# Patient Record
Sex: FEMALE | Race: WHITE | Employment: OTHER | URBAN - METROPOLITAN AREA
[De-identification: names, ages, dates, MRNs, and addresses within clinical notes are randomized per-mention and may not be internally consistent; named-entity substitution may affect disease eponyms.]

---

## 2021-01-09 ENCOUNTER — HOSPITAL ENCOUNTER (INPATIENT)
Age: 71
LOS: 3 days | Discharge: HOME | DRG: 177 | End: 2021-01-12
Attending: EMERGENCY MEDICINE | Admitting: INTERNAL MEDICINE
Payer: MEDICARE

## 2021-01-09 ENCOUNTER — APPOINTMENT (OUTPATIENT)
Dept: GENERAL RADIOLOGY | Age: 71
DRG: 177 | End: 2021-01-09
Attending: EMERGENCY MEDICINE
Payer: MEDICARE

## 2021-01-09 DIAGNOSIS — R09.02 HYPOXIA: ICD-10-CM

## 2021-01-09 DIAGNOSIS — Z20.822 SUSPECTED COVID-19 VIRUS INFECTION: Primary | ICD-10-CM

## 2021-01-09 DIAGNOSIS — R06.02 SOB (SHORTNESS OF BREATH): ICD-10-CM

## 2021-01-09 PROBLEM — U07.1 COVID-19 VIRUS INFECTION: Status: ACTIVE | Noted: 2021-01-09

## 2021-01-09 LAB
ABO + RH BLD: NORMAL
ALBUMIN SERPL-MCNC: 3.8 G/DL (ref 3.5–5)
ALBUMIN/GLOB SERPL: 1.1 {RATIO} (ref 1.1–2.2)
ALP SERPL-CCNC: 50 U/L (ref 45–117)
ALT SERPL-CCNC: 92 U/L (ref 12–78)
ANION GAP SERPL CALC-SCNC: 7 MMOL/L (ref 5–15)
APTT PPP: 26.4 SEC (ref 22.1–31)
AST SERPL-CCNC: 144 U/L (ref 15–37)
ATRIAL RATE: 102 BPM
BASOPHILS # BLD: 0 K/UL (ref 0–0.1)
BASOPHILS NFR BLD: 1 % (ref 0–1)
BILIRUB SERPL-MCNC: 0.7 MG/DL (ref 0.2–1)
BLOOD GROUP ANTIBODIES SERPL: NORMAL
BNP SERPL-MCNC: 38 PG/ML
BUN SERPL-MCNC: 13 MG/DL (ref 6–20)
BUN/CREAT SERPL: 14 (ref 12–20)
CALCIUM SERPL-MCNC: 9 MG/DL (ref 8.5–10.1)
CALCULATED P AXIS, ECG09: 34 DEGREES
CALCULATED R AXIS, ECG10: 7 DEGREES
CALCULATED T AXIS, ECG11: 4 DEGREES
CHLORIDE SERPL-SCNC: 103 MMOL/L (ref 97–108)
CO2 SERPL-SCNC: 25 MMOL/L (ref 21–32)
COMMENT, HOLDF: NORMAL
COVID-19 RAPID TEST, COVR: DETECTED
CREAT SERPL-MCNC: 0.96 MG/DL (ref 0.55–1.02)
CRP SERPL-MCNC: 0.49 MG/DL (ref 0–0.6)
D DIMER PPP FEU-MCNC: 0.7 MG/L FEU (ref 0–0.65)
DIAGNOSIS, 93000: NORMAL
DIFFERENTIAL METHOD BLD: NORMAL
EOSINOPHIL # BLD: 0 K/UL (ref 0–0.4)
EOSINOPHIL NFR BLD: 1 % (ref 0–7)
ERYTHROCYTE [DISTWIDTH] IN BLOOD BY AUTOMATED COUNT: 12.7 % (ref 11.5–14.5)
FERRITIN SERPL-MCNC: 405 NG/ML (ref 8–252)
FIBRINOGEN PPP-MCNC: 376 MG/DL (ref 200–475)
GLOBULIN SER CALC-MCNC: 3.4 G/DL (ref 2–4)
GLUCOSE BLD STRIP.AUTO-MCNC: 179 MG/DL (ref 65–100)
GLUCOSE BLD STRIP.AUTO-MCNC: 301 MG/DL (ref 65–100)
GLUCOSE BLD STRIP.AUTO-MCNC: 311 MG/DL (ref 65–100)
GLUCOSE SERPL-MCNC: 231 MG/DL (ref 65–100)
HCT VFR BLD AUTO: 43 % (ref 35–47)
HGB BLD-MCNC: 14.1 G/DL (ref 11.5–16)
IMM GRANULOCYTES # BLD AUTO: 0 K/UL (ref 0–0.04)
IMM GRANULOCYTES NFR BLD AUTO: 0 % (ref 0–0.5)
INR PPP: 1 (ref 0.9–1.1)
LDH SERPL L TO P-CCNC: 214 U/L (ref 81–246)
LYMPHOCYTES # BLD: 1.3 K/UL (ref 0.8–3.5)
LYMPHOCYTES NFR BLD: 30 % (ref 12–49)
MAGNESIUM SERPL-MCNC: 1.7 MG/DL (ref 1.6–2.4)
MCH RBC QN AUTO: 28.5 PG (ref 26–34)
MCHC RBC AUTO-ENTMCNC: 32.8 G/DL (ref 30–36.5)
MCV RBC AUTO: 86.9 FL (ref 80–99)
MONOCYTES # BLD: 0.5 K/UL (ref 0–1)
MONOCYTES NFR BLD: 13 % (ref 5–13)
NEUTS SEG # BLD: 2.4 K/UL (ref 1.8–8)
NEUTS SEG NFR BLD: 55 % (ref 32–75)
NRBC # BLD: 0 K/UL (ref 0–0.01)
NRBC BLD-RTO: 0 PER 100 WBC
P-R INTERVAL, ECG05: 134 MS
PLATELET # BLD AUTO: 152 K/UL (ref 150–400)
PMV BLD AUTO: 11.4 FL (ref 8.9–12.9)
POTASSIUM SERPL-SCNC: 3.5 MMOL/L (ref 3.5–5.1)
PROT SERPL-MCNC: 7.2 G/DL (ref 6.4–8.2)
PROTHROMBIN TIME: 10.9 SEC (ref 9–11.1)
Q-T INTERVAL, ECG07: 334 MS
QRS DURATION, ECG06: 82 MS
QTC CALCULATION (BEZET), ECG08: 435 MS
RBC # BLD AUTO: 4.95 M/UL (ref 3.8–5.2)
SAMPLES BEING HELD,HOLD: NORMAL
SERVICE CMNT-IMP: ABNORMAL
SODIUM SERPL-SCNC: 135 MMOL/L (ref 136–145)
SOURCE, COVRS: ABNORMAL
SPECIMEN EXP DATE BLD: NORMAL
SPECIMEN SOURCE, FCOV2M: ABNORMAL
SPECIMEN TYPE, XMCV1T: ABNORMAL
THERAPEUTIC RANGE,PTTT: NORMAL SECS (ref 58–77)
TROPONIN I SERPL-MCNC: <0.05 NG/ML
VENTRICULAR RATE, ECG03: 102 BPM
WBC # BLD AUTO: 4.2 K/UL (ref 3.6–11)

## 2021-01-09 PROCEDURE — 86900 BLOOD TYPING SEROLOGIC ABO: CPT

## 2021-01-09 PROCEDURE — 86140 C-REACTIVE PROTEIN: CPT

## 2021-01-09 PROCEDURE — 65660000000 HC RM CCU STEPDOWN

## 2021-01-09 PROCEDURE — 71045 X-RAY EXAM CHEST 1 VIEW: CPT

## 2021-01-09 PROCEDURE — 36415 COLL VENOUS BLD VENIPUNCTURE: CPT

## 2021-01-09 PROCEDURE — 93005 ELECTROCARDIOGRAM TRACING: CPT

## 2021-01-09 PROCEDURE — XW033E5 INTRODUCTION OF REMDESIVIR ANTI-INFECTIVE INTO PERIPHERAL VEIN, PERCUTANEOUS APPROACH, NEW TECHNOLOGY GROUP 5: ICD-10-PCS | Performed by: HOSPITALIST

## 2021-01-09 PROCEDURE — 74011250637 HC RX REV CODE- 250/637: Performed by: HOSPITALIST

## 2021-01-09 PROCEDURE — 74011000250 HC RX REV CODE- 250: Performed by: HOSPITALIST

## 2021-01-09 PROCEDURE — 74011000258 HC RX REV CODE- 258: Performed by: HOSPITALIST

## 2021-01-09 PROCEDURE — 83615 LACTATE (LD) (LDH) ENZYME: CPT

## 2021-01-09 PROCEDURE — 99285 EMERGENCY DEPT VISIT HI MDM: CPT

## 2021-01-09 PROCEDURE — 87635 SARS-COV-2 COVID-19 AMP PRB: CPT

## 2021-01-09 PROCEDURE — 74011250636 HC RX REV CODE- 250/636: Performed by: INTERNAL MEDICINE

## 2021-01-09 PROCEDURE — 85730 THROMBOPLASTIN TIME PARTIAL: CPT

## 2021-01-09 PROCEDURE — 83880 ASSAY OF NATRIURETIC PEPTIDE: CPT

## 2021-01-09 PROCEDURE — 82962 GLUCOSE BLOOD TEST: CPT

## 2021-01-09 PROCEDURE — 82728 ASSAY OF FERRITIN: CPT

## 2021-01-09 PROCEDURE — 84484 ASSAY OF TROPONIN QUANT: CPT

## 2021-01-09 PROCEDURE — 85384 FIBRINOGEN ACTIVITY: CPT

## 2021-01-09 PROCEDURE — 85025 COMPLETE CBC W/AUTO DIFF WBC: CPT

## 2021-01-09 PROCEDURE — 74011250636 HC RX REV CODE- 250/636: Performed by: HOSPITALIST

## 2021-01-09 PROCEDURE — 83735 ASSAY OF MAGNESIUM: CPT

## 2021-01-09 PROCEDURE — 74011636637 HC RX REV CODE- 636/637: Performed by: INTERNAL MEDICINE

## 2021-01-09 PROCEDURE — 80053 COMPREHEN METABOLIC PANEL: CPT

## 2021-01-09 PROCEDURE — 85610 PROTHROMBIN TIME: CPT

## 2021-01-09 PROCEDURE — 74011250637 HC RX REV CODE- 250/637: Performed by: INTERNAL MEDICINE

## 2021-01-09 PROCEDURE — 85379 FIBRIN DEGRADATION QUANT: CPT

## 2021-01-09 RX ORDER — ACETAMINOPHEN 325 MG/1
650 TABLET ORAL
Status: DISCONTINUED | OUTPATIENT
Start: 2021-01-09 | End: 2021-01-12 | Stop reason: HOSPADM

## 2021-01-09 RX ORDER — ACETAMINOPHEN 650 MG/1
650 SUPPOSITORY RECTAL
Status: DISCONTINUED | OUTPATIENT
Start: 2021-01-09 | End: 2021-01-12 | Stop reason: HOSPADM

## 2021-01-09 RX ORDER — GUAIFENESIN 100 MG/5ML
81 LIQUID (ML) ORAL DAILY
Status: DISCONTINUED | OUTPATIENT
Start: 2021-01-10 | End: 2021-01-12 | Stop reason: HOSPADM

## 2021-01-09 RX ORDER — ENOXAPARIN SODIUM 100 MG/ML
40 INJECTION SUBCUTANEOUS EVERY 12 HOURS
Status: DISCONTINUED | OUTPATIENT
Start: 2021-01-10 | End: 2021-01-12 | Stop reason: HOSPADM

## 2021-01-09 RX ORDER — OMEGA-3 FATTY ACIDS/FISH OIL 300-1000MG
1 CAPSULE ORAL 2 TIMES DAILY
Status: DISCONTINUED | OUTPATIENT
Start: 2021-01-09 | End: 2021-01-09 | Stop reason: CLARIF

## 2021-01-09 RX ORDER — FERROUS SULFATE, DRIED 160(50) MG
1 TABLET, EXTENDED RELEASE ORAL 2 TIMES DAILY WITH MEALS
Status: DISCONTINUED | OUTPATIENT
Start: 2021-01-09 | End: 2021-01-12 | Stop reason: HOSPADM

## 2021-01-09 RX ORDER — ENOXAPARIN SODIUM 100 MG/ML
30 INJECTION SUBCUTANEOUS EVERY 12 HOURS
Status: DISCONTINUED | OUTPATIENT
Start: 2021-01-09 | End: 2021-01-09

## 2021-01-09 RX ORDER — MAGNESIUM SULFATE 100 %
4 CRYSTALS MISCELLANEOUS AS NEEDED
Status: DISCONTINUED | OUTPATIENT
Start: 2021-01-09 | End: 2021-01-12 | Stop reason: HOSPADM

## 2021-01-09 RX ORDER — LISINOPRIL 20 MG/1
20 TABLET ORAL DAILY
Status: DISCONTINUED | OUTPATIENT
Start: 2021-01-10 | End: 2021-01-12 | Stop reason: HOSPADM

## 2021-01-09 RX ORDER — FAMOTIDINE 20 MG/1
20 TABLET, FILM COATED ORAL 2 TIMES DAILY
Status: DISCONTINUED | OUTPATIENT
Start: 2021-01-09 | End: 2021-01-12 | Stop reason: HOSPADM

## 2021-01-09 RX ORDER — DEXTROSE 50 % IN WATER (D50W) INTRAVENOUS SYRINGE
12.5-25 AS NEEDED
Status: DISCONTINUED | OUTPATIENT
Start: 2021-01-09 | End: 2021-01-12 | Stop reason: HOSPADM

## 2021-01-09 RX ORDER — INSULIN LISPRO 100 [IU]/ML
INJECTION, SOLUTION INTRAVENOUS; SUBCUTANEOUS
Status: DISCONTINUED | OUTPATIENT
Start: 2021-01-09 | End: 2021-01-12 | Stop reason: HOSPADM

## 2021-01-09 RX ORDER — HYDROCHLOROTHIAZIDE 25 MG/1
25 TABLET ORAL DAILY
Status: DISCONTINUED | OUTPATIENT
Start: 2021-01-10 | End: 2021-01-12 | Stop reason: HOSPADM

## 2021-01-09 RX ORDER — ONDANSETRON 4 MG/1
4 TABLET, ORALLY DISINTEGRATING ORAL
Status: DISCONTINUED | OUTPATIENT
Start: 2021-01-09 | End: 2021-01-12 | Stop reason: HOSPADM

## 2021-01-09 RX ORDER — GUAIFENESIN/DEXTROMETHORPHAN 100-10MG/5
5 SYRUP ORAL
Status: DISCONTINUED | OUTPATIENT
Start: 2021-01-09 | End: 2021-01-12 | Stop reason: HOSPADM

## 2021-01-09 RX ORDER — DEXAMETHASONE 4 MG/1
6 TABLET ORAL DAILY
Status: DISCONTINUED | OUTPATIENT
Start: 2021-01-09 | End: 2021-01-12 | Stop reason: HOSPADM

## 2021-01-09 RX ADMIN — DEXAMETHASONE 6 MG: 4 TABLET ORAL at 13:35

## 2021-01-09 RX ADMIN — Medication 1 TABLET: at 18:22

## 2021-01-09 RX ADMIN — REMDESIVIR 200 MG: 100 INJECTION, POWDER, LYOPHILIZED, FOR SOLUTION INTRAVENOUS at 13:36

## 2021-01-09 RX ADMIN — ENOXAPARIN SODIUM 30 MG: 30 INJECTION SUBCUTANEOUS at 12:59

## 2021-01-09 RX ADMIN — ENOXAPARIN SODIUM 40 MG: 40 INJECTION SUBCUTANEOUS at 23:30

## 2021-01-09 RX ADMIN — INSULIN LISPRO 4 UNITS: 100 INJECTION, SOLUTION INTRAVENOUS; SUBCUTANEOUS at 22:53

## 2021-01-09 RX ADMIN — INSULIN LISPRO 7 UNITS: 100 INJECTION, SOLUTION INTRAVENOUS; SUBCUTANEOUS at 18:24

## 2021-01-09 RX ADMIN — FAMOTIDINE 20 MG: 20 TABLET, FILM COATED ORAL at 13:35

## 2021-01-09 RX ADMIN — Medication 1 CAPSULE: at 18:22

## 2021-01-09 RX ADMIN — FAMOTIDINE 20 MG: 20 TABLET, FILM COATED ORAL at 22:01

## 2021-01-09 NOTE — H&P
Hospitalist Admission Note    NAME: Howard Ngo   :     MRN:  [id number]     Date/Time:  2021 11:36 AM    Patient PCP: Madelyn Gordon MD  ________________________________________________________________________    My assessment of this patient's clinical condition and my plan of care is as follows. Assessment / Plan:  Update  Sarscov2 positive so will start Decadron and remdesivir. D/w pt about risks and benefits of remdesivir and she agreed. Suspected COVID-19 pneumonia  Mild hypoxia  -Patient desaturated to about 89% and had to be started on 2 L nasal cannula but is comfortable and not using accessory muscles  -Had exposure to grandson about 5 days ago  -ER has ordered SARS-CoV-2 but is still pending  -Consider starting on Decadron and remdesivir if positive. We will also start vitamin C and zinc as well if positive. -D-dimer is slightly elevated at 0.7. Check D-dimer daily  -Check procalcitonin level and if elevated will start antibiotics  -Continue oxygen by nasal cannula currently she is on 2 L  -Continue Lovenox for DVT prophylaxis    Mild transaminitis likely due to COVID-19  -Check CMP in a.m. CMP will need to be monitored daily if started on remdesivir. Diabetes mellitus type 2  -On Metformin and Trulicity at home  -Start insulin sliding scale with blood sugar checks. Hypertension  Dyslipidemia  Obesity  -Resume home lisinopril and HCTZ  -Resume home blood was on          Code Status: Full code  Surrogate Decision Maker:     DVT Prophylaxis: Lovenox  GI Prophylaxis: not indicated    Baseline: From home independent        Subjective:   CHIEF COMPLAINT: Shortness of breath    HISTORY OF PRESENT ILLNESS:     Anita Weeks is a 72 y.o. female who presents with past medical history of diabetes mellitus, hypertension, dyslipidemia is coming the hospital chief complaint of shortness of breath and also generalized weakness.   Patient reports she had exposure to her grandson who tested positive for COVID-19. She reports shortness of breath is worse with minimal exertion and also has cough without any phlegm. She also reports feeling weak in general involving all 4 extremities. She also reports fatigue. She reports myalgias along with chills. Does not report any fever at home. Does not report any abdominal pain, nausea or vomiting. Denies focal weakness, tingling or numbness. Denies long distance travel, trauma rash over the chest.    On arrival to ED, she was noted to have hypoxia and required 2 L oxygen by nasal cannula. On labs CBC was within normal limits. CMP showed elevated ALT of 92 and AST of 144. Troponin was 0.05. We were asked to admit for work up and evaluation of the above problems. Past Medical History:   Diagnosis Date    HTN 5/19/2009    Hyperlipidemia 5/19/2009    Menopause     Type II or unspecified type diabetes mellitus without mention of complication, not stated as uncontrolled 5/19/2009        Past Surgical History:   Procedure Laterality Date    HX TONSILLECTOMY         Social History     Tobacco Use    Smoking status: Never Smoker    Smokeless tobacco: Never Used   Substance Use Topics    Alcohol use: No        Family History   Problem Relation Age of Onset    Hypertension Mother     Cancer Mother         carcinoid on small intestine    Cancer Father         stomach    Allergy-severe Neg Hx      Allergies   Allergen Reactions    Codeine Itching        Prior to Admission medications    Medication Sig Start Date End Date Taking? Authorizing Provider   benazepril-hydrochlorothiazide (LOTENSIN HCT) 20-25 mg per tablet TAKE 1 TABLET BY MOUTH DAILY 5/14/13   Freda Gordillo MD   LOVAZA 1 gram capsule TAKE ONE CAPSULE BY MOUTH TWICE A DAY 3/16/13   Freda Gordillo MD   aspirin 81 mg chewable tablet Take 81 mg by mouth daily.     Provider, Historical   calcium-vitamin D (CALCIUM 500+D) 500 mg(1,250mg) -200 unit per tablet Take 1 Tab by mouth two (2) times daily (with meals). Provider, Historical       REVIEW OF SYSTEMS:     I am not able to complete the review of systems because: The patient is intubated and sedated    The patient has altered mental status due to his acute medical problems    The patient has baseline aphasia from prior stroke(s)    The patient has baseline dementia and is not reliable historian    The patient is in acute medical distress and unable to provide information           Total of 12 systems reviewed as follows:       POSITIVE= underlined text  Negative = text not underlined  General:  fever, chills, sweats, generalized weakness, weight loss/gain,      loss of appetite   Eyes:    blurred vision, eye pain, loss of vision, double vision  ENT:    rhinorrhea, pharyngitis   Respiratory:   cough, sputum production, SOB, HUMPHREYS, wheezing, pleuritic pain   Cardiology:   chest pain, palpitations, orthopnea, PND, edema, syncope   Gastrointestinal:  abdominal pain , N/V, diarrhea, dysphagia, constipation, bleeding   Genitourinary:  frequency, urgency, dysuria, hematuria, incontinence   Muskuloskeletal :  arthralgia, myalgia, back pain  Hematology:  easy bruising, nose or gum bleeding, lymphadenopathy   Dermatological: rash, ulceration, pruritis, color change / jaundice  Endocrine:   hot flashes or polydipsia   Neurological:  headache, dizziness, confusion, focal weakness, paresthesia,     Speech difficulties, memory loss, gait difficulty  Psychological: Feelings of anxiety, depression, agitation    Objective:   VITALS:    Visit Vitals  BP (!) 141/75   Pulse 95   Temp 99.9 °F (37.7 °C)   Resp 25   Ht 5' 6\" (1.676 m)   Wt 95.3 kg (210 lb)   SpO2 90%   BMI 33.89 kg/m²       PHYSICAL EXAM:    General:    Alert, cooperative, no distress, appears stated age.      HEENT: Atraumatic, anicteric sclerae, pink conjunctivae     No oral ulcers, mucosa moist, throat clear, dentition fair  Neck:  Supple, symmetrical, thyroid: non tender  Lungs:   Clear to auscultation bilaterally. No Wheezing or Rhonchi. No rales. Chest wall:  No tenderness  No Accessory muscle use. Heart:   Regular  rhythm,  No  murmur   No edema  Abdomen:   Soft, non-tender. Not distended. Bowel sounds normal  Extremities: No cyanosis. No clubbing,      Skin turgor normal, Capillary refill normal, Radial dial pulse 2+  Skin:     Not pale. Not Jaundiced  No rashes   Psych:  Not anxious or agitated. Neurologic: EOMs intact. No facial asymmetry. No aphasia or slurred speech. Symmetrical strength, Sensation grossly intact. Alert and oriented X 4.     _______________________________________________________________________  Care Plan discussed with:    Comments   Patient y    Family  y    RN y    Care Manager                    Consultant:      _______________________________________________________________________  Expected  Disposition:   Home with Family y   HH/PT/OT/RN    SNF/LTC    [facility]    ________________________________________________________________________  TOTAL TIME: 61  Minutes    Critical Care Provided     Minutes non procedure based      Comments    y Reviewed previous records   >50% of visit spent in counseling and coordination of care y Discussion with patient and/or family and questions answered       ________________________________________________________________________  Signed: Lopez Jordan MD    Procedures: see electronic medical records for all procedures/Xrays and details which were not copied into this note but were reviewed prior to creation of Plan.     LAB DATA REVIEWED:    Recent Results (from the past 24 hour(s))   CBC WITH AUTOMATED DIFF    Collection Time: 01/09/21  9:30 AM   Result Value Ref Range    WBC 4.2 3.6 - 11.0 K/uL    RBC 4.95 3.80 - 5.20 M/uL    HGB 14.1 11.5 - 16.0 g/dL    HCT 43.0 35.0 - 47.0 %    MCV 86.9 80.0 - 99.0 FL    MCH 28.5 26.0 - 34.0 PG    MCHC 32.8 30.0 - 36.5 g/dL    RDW 12.7 11.5 - 14.5 % PLATELET 667 816 - 863 K/uL    MPV 11.4 8.9 - 12.9 FL    NRBC 0.0 0  WBC    ABSOLUTE NRBC 0.00 0.00 - 0.01 K/uL    NEUTROPHILS 55 32 - 75 %    LYMPHOCYTES 30 12 - 49 %    MONOCYTES 13 5 - 13 %    EOSINOPHILS 1 0 - 7 %    BASOPHILS 1 0 - 1 %    IMMATURE GRANULOCYTES 0 0.0 - 0.5 %    ABS. NEUTROPHILS 2.4 1.8 - 8.0 K/UL    ABS. LYMPHOCYTES 1.3 0.8 - 3.5 K/UL    ABS. MONOCYTES 0.5 0.0 - 1.0 K/UL    ABS. EOSINOPHILS 0.0 0.0 - 0.4 K/UL    ABS. BASOPHILS 0.0 0.0 - 0.1 K/UL    ABS. IMM. GRANS. 0.0 0.00 - 0.04 K/UL    DF AUTOMATED     METABOLIC PANEL, COMPREHENSIVE    Collection Time: 01/09/21  9:30 AM   Result Value Ref Range    Sodium 135 (L) 136 - 145 mmol/L    Potassium 3.5 3.5 - 5.1 mmol/L    Chloride 103 97 - 108 mmol/L    CO2 25 21 - 32 mmol/L    Anion gap 7 5 - 15 mmol/L    Glucose 231 (H) 65 - 100 mg/dL    BUN 13 6 - 20 MG/DL    Creatinine 0.96 0.55 - 1.02 MG/DL    BUN/Creatinine ratio 14 12 - 20      GFR est AA >60 >60 ml/min/1.73m2    GFR est non-AA 58 (L) >60 ml/min/1.73m2    Calcium 9.0 8.5 - 10.1 MG/DL    Bilirubin, total 0.7 0.2 - 1.0 MG/DL    ALT (SGPT) 92 (H) 12 - 78 U/L    AST (SGOT) 144 (H) 15 - 37 U/L    Alk. phosphatase 50 45 - 117 U/L    Protein, total 7.2 6.4 - 8.2 g/dL    Albumin 3.8 3.5 - 5.0 g/dL    Globulin 3.4 2.0 - 4.0 g/dL    A-G Ratio 1.1 1.1 - 2.2     NT-PRO BNP    Collection Time: 01/09/21  9:30 AM   Result Value Ref Range    NT pro-BNP 38 <125 PG/ML   TROPONIN I    Collection Time: 01/09/21  9:30 AM   Result Value Ref Range    Troponin-I, Qt. <0.05 <0.05 ng/mL   D DIMER    Collection Time: 01/09/21  9:30 AM   Result Value Ref Range    D-dimer 0.70 (H) 0.00 - 0.65 mg/L FEU   SAMPLES BEING HELD    Collection Time: 01/09/21  9:30 AM   Result Value Ref Range    SAMPLES BEING HELD 1RED     COMMENT        Add-on orders for these samples will be processed based on acceptable specimen integrity and analyte stability, which may vary by analyte.    EKG, 12 LEAD, INITIAL    Collection Time: 01/09/21  9:42 AM   Result Value Ref Range    Ventricular Rate 102 BPM    Atrial Rate 102 BPM    P-R Interval 134 ms    QRS Duration 82 ms    Q-T Interval 334 ms    QTC Calculation (Bezet) 435 ms    Calculated P Axis 34 degrees    Calculated R Axis 7 degrees    Calculated T Axis 4 degrees    Diagnosis Sinus tachycardia  No previous ECGs available

## 2021-01-09 NOTE — PROGRESS NOTES
Lovenox Monitoring  Indication: low intensity anticoagulation for COVID-19  Recent Labs     01/09/21  0930   HGB 14.1      INR 1.0   CREA 0.96     Current Weight: 95.3 kg (BMI 33.9)  Est. CrCl = 64 ml/min  Current Dose: 30 mg subcutaneously every 12 hours.   Plan: Change to 40 mg Q12H for BMI > 30.    (CLOSE I-vent after review and verification)

## 2021-01-09 NOTE — ROUTINE PROCESS
TRANSFER - OUT REPORT: 
 
Verbal report given to [facility]) on Sergio Gadlamez  being transferred to (unit) for routine progression of care Report consisted of patients Situation, Background, Assessment and  
Recommendations(SBAR). Information from the following report(s) SBAR, ED Summary, [facility] and Recent Results was reviewed with the receiving nurse. Lines:  
Peripheral IV 01/09/21 Left Antecubital (Active) Site Assessment Clean, dry, & intact 01/09/21 0944 Phlebitis Assessment 0 01/09/21 0944 Infiltration Assessment 0 01/09/21 0944 Dressing Status Clean, dry, & intact 01/09/21 0944 Opportunity for questions and clarification was provided. Patient transported with: 
 [employer]

## 2021-01-09 NOTE — ED NOTES
Pt reports SOB x3 days. Pt was exposed to her grandson who tested positive for COVID. Pt has hx of DM and HTN.

## 2021-01-09 NOTE — ED PROVIDER NOTES
HPI     Please note that this dictation was completed with [employer], the computer voice recognition software. Quite often unanticipated grammatical, syntax, homophones, and other interpretive errors are inadvertently transcribed by the computer software. Please disregard these errors. Please excuse any errors that have escaped final proofreading. 73 yo female wit h/o HTN, Hyperlipidemia, DM here with cough and sob. Pt exposed to grandson with covid 5 days ago. Pt developed cough and some sob 2 days ago on Thursday. The sob seems to be better when walking. She feels like she can't get enough air in. Pulse ox at home was 86-88% RA and same pulse ox on  was upper 90's. Denies congestion or anosmia.  + fatigue. Denies leg pain, swelling or h/o blood clots. Denies cp, fever, n/v/d, or other complaints. SHX:  . Nonsmoker.       Past Medical History:   Diagnosis Date    HTN 5/19/2009    Hyperlipidemia 5/19/2009    Menopause     Type II or unspecified type diabetes mellitus without mention of complication, not stated as uncontrolled 5/19/2009       Past Surgical History:   Procedure Laterality Date    HX TONSILLECTOMY           Family History:   Problem Relation Age of Onset    Hypertension Mother     Cancer Mother         carcinoid on small intestine    Cancer Father         stomach    Allergy-severe Neg Hx        Social History     Socioeconomic History    Marital status:      Spouse name: Not on file    Number of children: Not on file    Years of education: Not on file    Highest education level: Not on file   Occupational History    Not on file   Social Needs    Financial resource strain: Not on file    Food insecurity     Worry: Not on file     Inability: Not on file    Transportation needs     Medical: Not on file     Non-medical: Not on file   Tobacco Use    Smoking status: Never Smoker    Smokeless tobacco: Never Used   Substance and Sexual Activity    Alcohol use: No    Drug use: No    Sexual activity: Yes   Lifestyle    Physical activity     Days per week: Not on file     Minutes per session: Not on file    Stress: Not on file   Relationships    Social connections     Talks on phone: Not on file     Gets together: Not on file     Attends [employer] service: Not on file     Active member of club or organization: Not on file     Attends meetings of clubs or organizations: Not on file     Relationship status: Not on file    Intimate partner violence     Fear of current or ex partner: Not on file     Emotionally abused: Not on file     Physically abused: Not on file     Forced sexual activity: Not on file   Other Topics Concern    Not on file   Social History Narrative    Not on file         ALLERGIES: Codeine    Review of Systems   Constitutional: Positive for fatigue. Negative for chills and fever. Respiratory: Positive for shortness of breath. Cardiovascular: Negative for chest pain. All other systems reviewed and are negative. Vitals:    01/09/21 0918   BP: (!) 142/112   Pulse: (!) 111   Resp: 18   Temp: 99.9 °F (37.7 °C)   SpO2: 95%   Weight: 95.3 kg (210 lb)   Height: 5' 6\" (1.676 m)            Physical Exam  Vitals signs and nursing note reviewed. Constitutional:       Appearance: She is well-developed. HENT:      Head: Normocephalic and atraumatic. Nose: No congestion or rhinorrhea. Mouth/Throat:      Mouth: Mucous membranes are moist.      Pharynx: No oropharyngeal exudate. Eyes:      General: No scleral icterus. Right eye: No discharge. Left eye: No discharge. Conjunctiva/sclera: Conjunctivae normal.   Neck:      Musculoskeletal: Normal range of motion and neck supple. Cardiovascular:      Rate and Rhythm: Regular rhythm. Tachycardia present. Heart sounds: Normal heart sounds. No murmur. No friction rub. No gallop. Pulmonary:      Effort: Pulmonary effort is normal. No respiratory distress. Breath sounds: Normal breath sounds. No wheezing or rales. Abdominal:      General: Bowel sounds are normal. There is no distension. Palpations: Abdomen is soft. Tenderness: There is no abdominal tenderness. There is no guarding. Musculoskeletal: Normal range of motion. General: No tenderness. Lymphadenopathy:      Cervical: No cervical adenopathy. Skin:     General: Skin is warm and dry. Coloration: Skin is not pale. Findings: No rash. Neurological:      Mental Status: She is alert and oriented to person, place, and time. Cranial Nerves: No cranial nerve deficit. Coordination: Coordination normal.                  MDM      Cough and sob. Exposed to covid. Ambulated and pulse ox remained about 94% and relatively unchanged. Ddx:  Covid, CHF, MI, pneumonia and others. She is tachy so will check ddimer even though low risk for PE. Procedures        ED EKG interpretation:  Rhythm: sinus tachycardia; and regular . Rate (approx.): 102; Axis: normal; P wave: normal; QRS interval: normal ; ST/T wave: non-specific changes interiorly. This EKG was interpreted by Tiffanie Broderick MD,ED Provider. 10:40 AM  Oxygen saturations 89-92. Will admit. Place patient on nasal cannula oxygen. Contact hospitalist.  Updated patient,  and son. Perfect Serve Consult for Admission  10:45 AM    ED Room Number: VY87/25  Patient Name and age:  Maya Mcneal 72 y.o.  female  Working Diagnosis:   1. Suspected COVID-19 virus infection    2. Hypoxia    3. SOB (shortness of breath)        COVID-19 Suspicion:  yes  Sepsis present:  no  Reassessment needed: no  Code Status:  Full Code  Readmission: no  Isolation Requirements:  yes  Recommended Level of Care:  telemetry  Department:[facility] Adult ED - [phone]        Maya Mcneal was evaluated in the Emergency Department on 1/9/2021 for the symptoms described in the history of present illness.  He/she was evaluated in the context of the global COVID-19 pandemic, which necessitated consideration that the patient might be at risk for infection with the SARS-CoV-2 virus that causes COVID-19. Institutional protocols and algorithms that pertain to the evaluation of patients at risk for COVID-19 are in a state of rapid change based on information released by regulatory bodies including the CDC and federal and state organizations. These policies and algorithms were followed during the patient's care in the ED. Surrogate Decision Maker (Who do you want to make decisions for you in the event you are not able to?): Extended Emergency Contact Information  Primary Emergency Contact: [address] [place] Phone: [phone]  Relation: None      Recent Results (from the past 24 hour(s))   CBC WITH AUTOMATED DIFF    Collection Time: 01/09/21  9:30 AM   Result Value Ref Range    WBC 4.2 3.6 - 11.0 K/uL    RBC 4.95 3.80 - 5.20 M/uL    HGB 14.1 11.5 - 16.0 g/dL    HCT 43.0 35.0 - 47.0 %    MCV 86.9 80.0 - 99.0 FL    MCH 28.5 26.0 - 34.0 PG    MCHC 32.8 30.0 - 36.5 g/dL    RDW 12.7 11.5 - 14.5 %    PLATELET 890 293 - 624 K/uL    MPV 11.4 8.9 - 12.9 FL    NRBC 0.0 0  WBC    ABSOLUTE NRBC 0.00 0.00 - 0.01 K/uL    NEUTROPHILS 55 32 - 75 %    LYMPHOCYTES 30 12 - 49 %    MONOCYTES 13 5 - 13 %    EOSINOPHILS 1 0 - 7 %    BASOPHILS 1 0 - 1 %    IMMATURE GRANULOCYTES 0 0.0 - 0.5 %    ABS. NEUTROPHILS 2.4 1.8 - 8.0 K/UL    ABS. LYMPHOCYTES 1.3 0.8 - 3.5 K/UL    ABS. MONOCYTES 0.5 0.0 - 1.0 K/UL    ABS. EOSINOPHILS 0.0 0.0 - 0.4 K/UL    ABS. BASOPHILS 0.0 0.0 - 0.1 K/UL    ABS. IMM.  GRANS. 0.0 0.00 - 0.04 K/UL    DF AUTOMATED     METABOLIC PANEL, COMPREHENSIVE    Collection Time: 01/09/21  9:30 AM   Result Value Ref Range    Sodium 135 (L) 136 - 145 mmol/L    Potassium 3.5 3.5 - 5.1 mmol/L    Chloride 103 97 - 108 mmol/L    CO2 25 21 - 32 mmol/L    Anion gap 7 5 - 15 mmol/L    Glucose 231 (H) 65 - 100 mg/dL    BUN 13 6 - 20 MG/DL Creatinine 0.96 0.55 - 1.02 MG/DL    BUN/Creatinine ratio 14 12 - 20      GFR est AA >60 >60 ml/min/1.73m2    GFR est non-AA 58 (L) >60 ml/min/1.73m2    Calcium 9.0 8.5 - 10.1 MG/DL    Bilirubin, total 0.7 0.2 - 1.0 MG/DL    ALT (SGPT) 92 (H) 12 - 78 U/L    AST (SGOT) 144 (H) 15 - 37 U/L    Alk. phosphatase 50 45 - 117 U/L    Protein, total 7.2 6.4 - 8.2 g/dL    Albumin 3.8 3.5 - 5.0 g/dL    Globulin 3.4 2.0 - 4.0 g/dL    A-G Ratio 1.1 1.1 - 2.2     NT-PRO BNP    Collection Time: 01/09/21  9:30 AM   Result Value Ref Range    NT pro-BNP 38 <125 PG/ML   TROPONIN I    Collection Time: 01/09/21  9:30 AM   Result Value Ref Range    Troponin-I, Qt. <0.05 <0.05 ng/mL   D DIMER    Collection Time: 01/09/21  9:30 AM   Result Value Ref Range    D-dimer 0.70 (H) 0.00 - 0.65 mg/L FEU   SAMPLES BEING HELD    Collection Time: 01/09/21  9:30 AM   Result Value Ref Range    SAMPLES BEING HELD 1RED     COMMENT        Add-on orders for these samples will be processed based on acceptable specimen integrity and analyte stability, which may vary by analyte. EKG, 12 LEAD, INITIAL    Collection Time: 01/09/21  9:42 AM   Result Value Ref Range    Ventricular Rate 102 BPM    Atrial Rate 102 BPM    P-R Interval 134 ms    QRS Duration 82 ms    Q-T Interval 334 ms    QTC Calculation (Bezet) 435 ms    Calculated P Axis 34 degrees    Calculated R Axis 7 degrees    Calculated T Axis 4 degrees    Diagnosis Sinus tachycardia  No previous ECGs available          Xr Chest Port    Result Date: 1/9/2021  EXAM: XR CHEST PORT HISTORY: Short of breath. COMPARISON: None FINDINGS: Single view(s) of the chest. There is mild elevation of the right hemidiaphragm. The lungs are well inflated. No focal consolidation, pleural effusion, or pneumothorax. The cardiomediastinal silhouette is unremarkable. The visualized osseous structures are unremarkable. IMPRESSION: No acute cardiopulmonary process.

## 2021-01-10 LAB
ALBUMIN SERPL-MCNC: 3.3 G/DL (ref 3.5–5)
ALBUMIN/GLOB SERPL: 1 {RATIO} (ref 1.1–2.2)
ALP SERPL-CCNC: 46 U/L (ref 45–117)
ALT SERPL-CCNC: 78 U/L (ref 12–78)
ANION GAP SERPL CALC-SCNC: 7 MMOL/L (ref 5–15)
APTT PPP: 29.8 SEC (ref 22.1–31)
AST SERPL-CCNC: 88 U/L (ref 15–37)
BILIRUB SERPL-MCNC: 0.5 MG/DL (ref 0.2–1)
BUN SERPL-MCNC: 18 MG/DL (ref 6–20)
BUN/CREAT SERPL: 25 (ref 12–20)
CALCIUM SERPL-MCNC: 8.9 MG/DL (ref 8.5–10.1)
CHLORIDE SERPL-SCNC: 107 MMOL/L (ref 97–108)
CO2 SERPL-SCNC: 24 MMOL/L (ref 21–32)
CREAT SERPL-MCNC: 0.73 MG/DL (ref 0.55–1.02)
D DIMER PPP FEU-MCNC: 0.35 MG/L FEU (ref 0–0.65)
ERYTHROCYTE [DISTWIDTH] IN BLOOD BY AUTOMATED COUNT: 12.5 % (ref 11.5–14.5)
FIBRINOGEN PPP-MCNC: 352 MG/DL (ref 200–475)
GLOBULIN SER CALC-MCNC: 3.4 G/DL (ref 2–4)
GLUCOSE BLD STRIP.AUTO-MCNC: 233 MG/DL (ref 65–100)
GLUCOSE BLD STRIP.AUTO-MCNC: 308 MG/DL (ref 65–100)
GLUCOSE BLD STRIP.AUTO-MCNC: 323 MG/DL (ref 65–100)
GLUCOSE BLD STRIP.AUTO-MCNC: 327 MG/DL (ref 65–100)
GLUCOSE SERPL-MCNC: 229 MG/DL (ref 65–100)
HCT VFR BLD AUTO: 41.8 % (ref 35–47)
HGB BLD-MCNC: 13.4 G/DL (ref 11.5–16)
INR PPP: 1.1 (ref 0.9–1.1)
MCH RBC QN AUTO: 28.4 PG (ref 26–34)
MCHC RBC AUTO-ENTMCNC: 32.1 G/DL (ref 30–36.5)
MCV RBC AUTO: 88.6 FL (ref 80–99)
NRBC # BLD: 0 K/UL (ref 0–0.01)
NRBC BLD-RTO: 0 PER 100 WBC
PLATELET # BLD AUTO: 147 K/UL (ref 150–400)
PMV BLD AUTO: 11.9 FL (ref 8.9–12.9)
POTASSIUM SERPL-SCNC: 3.7 MMOL/L (ref 3.5–5.1)
PROCALCITONIN SERPL-MCNC: <0.05 NG/ML
PROT SERPL-MCNC: 6.7 G/DL (ref 6.4–8.2)
PROTHROMBIN TIME: 11.4 SEC (ref 9–11.1)
RBC # BLD AUTO: 4.72 M/UL (ref 3.8–5.2)
SERVICE CMNT-IMP: ABNORMAL
SODIUM SERPL-SCNC: 138 MMOL/L (ref 136–145)
THERAPEUTIC RANGE,PTTT: NORMAL SECS (ref 58–77)
WBC # BLD AUTO: 4.3 K/UL (ref 3.6–11)

## 2021-01-10 PROCEDURE — 85379 FIBRIN DEGRADATION QUANT: CPT

## 2021-01-10 PROCEDURE — 74011636637 HC RX REV CODE- 636/637: Performed by: INTERNAL MEDICINE

## 2021-01-10 PROCEDURE — 85610 PROTHROMBIN TIME: CPT

## 2021-01-10 PROCEDURE — 85730 THROMBOPLASTIN TIME PARTIAL: CPT

## 2021-01-10 PROCEDURE — 74011250636 HC RX REV CODE- 250/636: Performed by: HOSPITALIST

## 2021-01-10 PROCEDURE — 74011250637 HC RX REV CODE- 250/637: Performed by: INTERNAL MEDICINE

## 2021-01-10 PROCEDURE — 74011000258 HC RX REV CODE- 258: Performed by: HOSPITALIST

## 2021-01-10 PROCEDURE — 74011000250 HC RX REV CODE- 250: Performed by: HOSPITALIST

## 2021-01-10 PROCEDURE — 82962 GLUCOSE BLOOD TEST: CPT

## 2021-01-10 PROCEDURE — 80053 COMPREHEN METABOLIC PANEL: CPT

## 2021-01-10 PROCEDURE — 36415 COLL VENOUS BLD VENIPUNCTURE: CPT

## 2021-01-10 PROCEDURE — 74011250637 HC RX REV CODE- 250/637: Performed by: HOSPITALIST

## 2021-01-10 PROCEDURE — 74011636637 HC RX REV CODE- 636/637: Performed by: HOSPITALIST

## 2021-01-10 PROCEDURE — 84145 PROCALCITONIN (PCT): CPT

## 2021-01-10 PROCEDURE — 85027 COMPLETE CBC AUTOMATED: CPT

## 2021-01-10 PROCEDURE — 85384 FIBRINOGEN ACTIVITY: CPT

## 2021-01-10 PROCEDURE — 65660000000 HC RM CCU STEPDOWN

## 2021-01-10 PROCEDURE — 74011250636 HC RX REV CODE- 250/636: Performed by: INTERNAL MEDICINE

## 2021-01-10 RX ORDER — INSULIN GLARGINE 100 [IU]/ML
15 INJECTION, SOLUTION SUBCUTANEOUS
Status: DISCONTINUED | OUTPATIENT
Start: 2021-01-10 | End: 2021-01-12 | Stop reason: HOSPADM

## 2021-01-10 RX ORDER — MELATONIN
1000 DAILY
COMMUNITY

## 2021-01-10 RX ORDER — DULAGLUTIDE 1.5 MG/.5ML
1.5 INJECTION, SOLUTION SUBCUTANEOUS
COMMUNITY

## 2021-01-10 RX ORDER — METFORMIN HYDROCHLORIDE 500 MG/1
1000 TABLET, EXTENDED RELEASE ORAL 2 TIMES DAILY WITH MEALS
COMMUNITY

## 2021-01-10 RX ORDER — BENAZEPRIL HYDROCHLORIDE AND HYDROCHLOROTHIAZIDE 10; 12.5 MG/1; MG/1
1 TABLET ORAL DAILY
COMMUNITY

## 2021-01-10 RX ORDER — ASCORBIC ACID 500 MG
500 TABLET ORAL DAILY
COMMUNITY

## 2021-01-10 RX ORDER — ZINC SULFATE 50(220)MG
220 CAPSULE ORAL DAILY
COMMUNITY

## 2021-01-10 RX ADMIN — FAMOTIDINE 20 MG: 20 TABLET, FILM COATED ORAL at 21:32

## 2021-01-10 RX ADMIN — DEXAMETHASONE 6 MG: 4 TABLET ORAL at 08:03

## 2021-01-10 RX ADMIN — FAMOTIDINE 20 MG: 20 TABLET, FILM COATED ORAL at 08:03

## 2021-01-10 RX ADMIN — ENOXAPARIN SODIUM 40 MG: 40 INJECTION SUBCUTANEOUS at 11:29

## 2021-01-10 RX ADMIN — INSULIN GLARGINE 15 UNITS: 100 INJECTION, SOLUTION SUBCUTANEOUS at 22:00

## 2021-01-10 RX ADMIN — INSULIN LISPRO 4 UNITS: 100 INJECTION, SOLUTION INTRAVENOUS; SUBCUTANEOUS at 21:33

## 2021-01-10 RX ADMIN — HYDROCHLOROTHIAZIDE 25 MG: 25 TABLET ORAL at 08:03

## 2021-01-10 RX ADMIN — Medication 1 CAPSULE: at 08:03

## 2021-01-10 RX ADMIN — LISINOPRIL 20 MG: 20 TABLET ORAL at 08:03

## 2021-01-10 RX ADMIN — INSULIN LISPRO 7 UNITS: 100 INJECTION, SOLUTION INTRAVENOUS; SUBCUTANEOUS at 17:20

## 2021-01-10 RX ADMIN — Medication 1 TABLET: at 17:20

## 2021-01-10 RX ADMIN — Medication 1 CAPSULE: at 21:32

## 2021-01-10 RX ADMIN — Medication 1 TABLET: at 08:03

## 2021-01-10 RX ADMIN — INSULIN LISPRO 7 UNITS: 100 INJECTION, SOLUTION INTRAVENOUS; SUBCUTANEOUS at 11:29

## 2021-01-10 RX ADMIN — INSULIN LISPRO 3 UNITS: 100 INJECTION, SOLUTION INTRAVENOUS; SUBCUTANEOUS at 08:03

## 2021-01-10 RX ADMIN — ASPIRIN 81 MG: 81 TABLET, CHEWABLE ORAL at 08:03

## 2021-01-10 RX ADMIN — REMDESIVIR 100 MG: 5 INJECTION INTRAVENOUS at 14:20

## 2021-01-10 NOTE — PROGRESS NOTES
Nurse went in to give lunch to pt and she was experiencing a nose bleed. Pt was not alarmed and said that it was pretty normal and she gets them at home. I also mentioned it could possibly be from the nasal cannula in her nose. Notified MD, and bleed has sense stopped. Lasted about 5 minutes.

## 2021-01-10 NOTE — ROUTINE PROCESS
TRANSFER - OUT REPORT: 
 
Verbal report given to Shannon RN(name) on Wagner Fu  being transferred to (unit) for routine progression of care Report consisted of patients Situation, Background, Assessment and  
Recommendations(SBAR). Information from the following report(s) SBAR, ED Summary, [facility] and Recent Results was reviewed with the receiving nurse. Lines:  
Peripheral IV 01/09/21 Left Antecubital (Active) Site Assessment Clean, dry, & intact 01/09/21 0944 Phlebitis Assessment 0 01/09/21 0944 Infiltration Assessment 0 01/09/21 0944 Dressing Status Clean, dry, & intact 01/09/21 0944 Opportunity for questions and clarification was provided. Patient transported with: 
 O2 @ 2 liters Tech

## 2021-01-10 NOTE — PROGRESS NOTES
Admission Medication Reconciliation:    Information obtained from:  patient, chart review, [facility] on [address]:  NO    Comments/Recommendations: All medications/allergies have been reviewed and updated; Spoke with patient by telephone, unable to speak with patient face to face at this time due to general isolation precautions related to COVID-19 pandemic. The patient was able to confirm medications, but not doses. [facility] was called to confirm current medication dose. Changes made to Prior to Admission (PTA) Medication List:   ?   Medications Added:   - cholecalciferol, metformin, Trulicity, zinc, ascorbic acid  ? Medications Changed:   - benazepril/HCTZ- dose changed from 20/12.5 mg to 10/12.5 mg daily  ? Medications Removed:   - aspirin 81 mg  - calcium + vitamin D     ¹[employer] pharmacy benefit data reflects medications filled and processed through the patient's insurance, however   this data does NOT capture whether the medication was picked up or is currently being taken by the patient. Allergies:  Codeine    Significant PMH/Disease States:   Past Medical History:   Diagnosis Date    HTN 5/19/2009    Hyperlipidemia 5/19/2009    Menopause     Type II or unspecified type diabetes mellitus without mention of complication, not stated as uncontrolled 5/19/2009       Chief Complaint for this Admission:    Chief Complaint   Patient presents with    Shortness of Breath       Prior to Admission Medications:   Prior to Admission Medications   Prescriptions Last Dose Informant Patient Reported? Taking? LOVAZA 1 gram capsule   No No   Sig: TAKE ONE CAPSULE BY MOUTH TWICE A DAY   ascorbic acid, vitamin C, (VITAMIN C) 500 mg tablet   Yes Yes   Sig: Take 500 mg by mouth daily. benazepril-hydroCHLOROthiazide (LOTENSIN HCT) 10-12.5 mg per tablet   Yes Yes   Sig: Take 1 Tab by mouth daily.    cholecalciferol (VITAMIN D3) (1000 Units /25 mcg) tablet   Yes Yes   Sig: Take 1,000 Units by mouth daily. dulaglutide (Trulicity) 1.5 BW/9.6 mL sub-q pen   Yes Yes   Si.5 mg by SubCUTAneous route Every Thursday. metFORMIN ER (GLUCOPHAGE XR) 500 mg tablet   Yes Yes   Sig: Take 1,000 mg by mouth two (2) times daily (with meals). zinc sulfate (Zinc-220) 220 (50) mg capsule   Yes Yes   Sig: Take 220 mg by mouth daily. Facility-Administered Medications: None         Thank you for allowing pharmacy to participate in the coordination of this patient's care. If you have any other questions, please contact the medication reconciliation pharmacist at x [phone]. Judith Calderón, Pharm. D., BCPS

## 2021-01-10 NOTE — PROGRESS NOTES
[facility]  [facility]ospitalist Group                                                                                          Hospitalist Progress Note  Mar Arellano MD  Answering service: [phone] OR [phone] from in house phone        Date of Service:  1/10/2021  NAME:  Lucho Arauz  :    MRN:  [id number]      Admission Summary:   Angélica Rodriguez is a 72 y.o. female who presents with past medical history of diabetes mellitus, hypertension, dyslipidemia is coming the hospital chief complaint of shortness of breath and also generalized weakness. Patient reports she had exposure to her grandson who tested positive for COVID-19. She reports shortness of breath is worse with minimal exertion and also has cough without any phlegm. She also reports feeling weak in general involving all 4 extremities. She also reports fatigue. She reports myalgias along with chills. Does not report any fever at home. Does not report any abdominal pain, nausea or vomiting. Denies focal weakness, tingling or numbness. Denies long distance travel, trauma rash over the chest.     On arrival to ED, she was noted to have hypoxia and required 2 L oxygen by nasal cannula. On labs CBC was within normal limits. CMP showed elevated ALT of 92 and AST of 144. Troponin was 0.05.         Interval history / Subjective:   Seen and examined no complaints on 2 L nasal cannula breathing well  Covid positive since      Assessment & Plan:     Suspected COVID-19 pneumonia  Mild hypoxia on 2 L oxygen nasal cannula  -Patient desaturated to about 89% and had to be started on 2 L nasal cannula but is comfortable and not using accessory muscles  -Had exposure to grandson about 5 days ago  -Continue Decadron and remdesivir  -Consider starting on Decadron and remdesivir if positive. nc as well if positive. -D-dimer is slightly elevated at 0.7.   Check D-dimer daily anticoagulation as per coated Covid process protocol currently 40 mg of Lovenox twice daily  -Antibiotics given as pro Hakan normal  -Continue oxygen by nasal cannula currently she is on 2 L  -Continue Lovenox for DVT prophylaxis     Mild transaminitis likely due to COVID-19  -Check CMP in a.m. CMP will need to be monitored daily if started on remdesivir.     Diabetes mellitus type 2  -On Metformin and Trulicity at home  -Start insulin sliding scale with blood sugar checks. -BS Expected to go high will start basal insulin     Hypertension  Dyslipidemia-  need to take statin   Obesity  -Resume home lisinopril and HCTZ        Code status: Full  DVT prophylaxis: Lovenox    Care Plan discussed with: Patient/Family and Nurse  Anticipated Disposition: Home w/Family  Anticipated Discharge: 24 hours to 48 hours     Hospital Problems  Date Reviewed: 10/16/2012          Codes Class Noted POA    COVID-19 virus infection ICD-10-CM: U07.1  ICD-9-CM: 079.89  1/9/2021 Unknown                Review of Systems:   A comprehensive review of systems was negative. Xr Chest Port    Result Date: 1/9/2021  IMPRESSION: No acute cardiopulmonary process. Vital Signs:    Last 24hrs VS reviewed since prior progress note. Most recent are:  Visit Vitals  /66 (BP 1 Location: Right arm, BP Patient Position: At rest)   Pulse 72   Temp 97.5 °F (36.4 °C)   Resp 25   Ht 5' 6\" (1.676 m)   Wt 96.7 kg (213 lb 3.2 oz)   SpO2 95%   BMI 34.41 kg/m²         Intake/Output Summary (Last 24 hours) at 1/10/2021 1219  Last data filed at 1/9/2021 2158  Gross per 24 hour   Intake 240 ml   Output    Net 240 ml        Physical Examination:     I had a face to face encounter with this patient and independently examined them on 1/10/2021 as outlined below:          Constitutional:  No acute distress, cooperative, pleasant    ENT:  Oral mucosa moist, oropharynx benign. Resp:  CTA bilaterally. No wheezing/rhonchi/rales.  No accessory muscle use   CV:  Regular rhythm, normal rate, no murmurs, gallops, rubs    GI:  Soft, non distended, non tender. normoactive bowel sounds, no hepatosplenomegaly     Musculoskeletal:  No edema, warm, 2+ pulses throughout    Neurologic:  Moves all extremities. AAOx3, CN II-XII reviewed     Psych:  Good insight, Not anxious nor agitated. Data Review:    I personally reviewed  Image and labs      Labs:     Recent Labs     01/10/21  0420 01/09/21  0930   WBC 4.3 4.2   HGB 13.4 14.1   HCT 41.8 43.0   * 152     Recent Labs     01/10/21  0351 01/09/21  0930    135*   K 3.7 3.5    103   CO2 24 25   BUN 18 13   CREA 0.73 0.96   * 231*   CA 8.9 9.0   MG  --  1.7     Recent Labs     01/10/21  0351 01/09/21  0930   ALT 78 92*   AP 46 50   TBILI 0.5 0.7   TP 6.7 7.2   ALB 3.3* 3.8   GLOB 3.4 3.4     Recent Labs     01/10/21  0420 01/09/21  0930   INR 1.1 1.0   PTP 11.4* 10.9   APTT 29.8 26.4      Recent Labs     01/09/21 0930   FERR 405*      No results found for: FOL, RBCF   No results for input(s): PH, PCO2, PO2 in the last 72 hours.   Recent Labs     01/09/21 0930   TROIQ <0.05     Lab Results   Component Value Date/Time    Cholesterol, total 204 (H) 09/29/2011 02:28 AM    HDL Cholesterol 54 09/29/2011 02:28 AM    LDL, calculated 129 (H) 09/29/2011 02:28 AM    Triglyceride 104 09/29/2011 02:28 AM    CHOL/HDL Ratio 3.1 03/09/2010 03:33 PM     Lab Results   Component Value Date/Time    Glucose (POC) 308 (H) 01/10/2021 10:43 AM    Glucose (POC) 233 (H) 01/10/2021 06:36 AM    Glucose (POC) 311 (H) 01/09/2021 10:47 PM    Glucose (POC) 301 (H) 01/09/2021 06:12 PM    Glucose (POC) 179 (H) 01/09/2021 12:51 PM     No results found for: COLOR, APPRN, SPGRU, REFSG, AFUA, PROTU, GLUCU, KETU, BILU, UROU, LONI, LEUKU, GLUKE, EPSU, BACTU, WBCU, RBCU, CASTS, UCRY      Medications Reviewed:     Current Facility-Administered Medications   Medication Dose Route Frequency    aspirin chewable tablet 81 mg  81 mg Oral DAILY    calcium-vitamin D (OS-WILMA +D3) 500 mg-200 unit per tablet 1 Tab  1 Tab Oral BID WITH MEALS    acetaminophen (TYLENOL) tablet 650 mg  650 mg Oral Q6H PRN    Or    acetaminophen (TYLENOL) suppository 650 mg  650 mg Rectal Q6H PRN    guaiFENesin-dextromethorphan (ROBITUSSIN DM) 100-10 mg/5 mL syrup 5 mL  5 mL Oral Q4H PRN    dexAMETHasone (DECADRON) tablet 6 mg  6 mg Oral DAILY    insulin lispro (HUMALOG) injection   SubCUTAneous AC&HS    glucose chewable tablet 16 g  4 Tab Oral PRN    dextrose (D50W) injection syrg 12.5-25 g  12.5-25 g IntraVENous PRN    glucagon (GLUCAGEN) injection 1 mg  1 mg IntraMUSCular PRN    ondansetron (ZOFRAN ODT) tablet 4 mg  4 mg Oral Q6H PRN    famotidine (PEPCID) tablet 20 mg  20 mg Oral BID    remdesivir 100 mg in 0.9% sodium chloride 250 mL IVPB  100 mg IntraVENous Q24H    enoxaparin (LOVENOX) injection 40 mg  40 mg SubCUTAneous Q12H    lisinopriL (PRINIVIL, ZESTRIL) tablet 20 mg  20 mg Oral DAILY    And    hydroCHLOROthiazide (HYDRODIURIL) tablet 25 mg  25 mg Oral DAILY    fish oil-omega-3 fatty acids 340-1,000 mg capsule 1 Cap  1 Cap Oral Q12H     ______________________________________________________________________  EXPECTED LENGTH OF STAY: - - -  ACTUAL LENGTH OF STAY:          1                 Isrrael Tovar MD

## 2021-01-10 NOTE — PROGRESS NOTES
Bedside shift change report given to Scott Huber RN (oncoming nurse) by David Rueda RN (offgoing nurse). Report included the following information SBAR, Kardex, MAR, Accordion and Cardiac Rhythm NSR.

## 2021-01-11 LAB
ALBUMIN SERPL-MCNC: 3.3 G/DL (ref 3.5–5)
ALBUMIN/GLOB SERPL: 1 {RATIO} (ref 1.1–2.2)
ALP SERPL-CCNC: 45 U/L (ref 45–117)
ALT SERPL-CCNC: 62 U/L (ref 12–78)
ANION GAP SERPL CALC-SCNC: 7 MMOL/L (ref 5–15)
AST SERPL-CCNC: 43 U/L (ref 15–37)
BILIRUB SERPL-MCNC: 0.4 MG/DL (ref 0.2–1)
BUN SERPL-MCNC: 22 MG/DL (ref 6–20)
BUN/CREAT SERPL: 24 (ref 12–20)
CALCIUM SERPL-MCNC: 9.4 MG/DL (ref 8.5–10.1)
CHLORIDE SERPL-SCNC: 104 MMOL/L (ref 97–108)
CO2 SERPL-SCNC: 27 MMOL/L (ref 21–32)
CREAT SERPL-MCNC: 0.91 MG/DL (ref 0.55–1.02)
D DIMER PPP FEU-MCNC: 0.25 MG/L FEU (ref 0–0.65)
GLOBULIN SER CALC-MCNC: 3.3 G/DL (ref 2–4)
GLUCOSE BLD STRIP.AUTO-MCNC: 228 MG/DL (ref 65–100)
GLUCOSE BLD STRIP.AUTO-MCNC: 331 MG/DL (ref 65–100)
GLUCOSE BLD STRIP.AUTO-MCNC: 339 MG/DL (ref 65–100)
GLUCOSE BLD STRIP.AUTO-MCNC: 343 MG/DL (ref 65–100)
GLUCOSE BLD STRIP.AUTO-MCNC: 360 MG/DL (ref 65–100)
GLUCOSE BLD STRIP.AUTO-MCNC: 365 MG/DL (ref 65–100)
GLUCOSE SERPL-MCNC: 248 MG/DL (ref 65–100)
POTASSIUM SERPL-SCNC: 3.8 MMOL/L (ref 3.5–5.1)
PROT SERPL-MCNC: 6.6 G/DL (ref 6.4–8.2)
SERVICE CMNT-IMP: ABNORMAL
SODIUM SERPL-SCNC: 138 MMOL/L (ref 136–145)

## 2021-01-11 PROCEDURE — 74011250636 HC RX REV CODE- 250/636: Performed by: HOSPITALIST

## 2021-01-11 PROCEDURE — 74011250637 HC RX REV CODE- 250/637: Performed by: HOSPITALIST

## 2021-01-11 PROCEDURE — 65270000029 HC RM PRIVATE

## 2021-01-11 PROCEDURE — 80053 COMPREHEN METABOLIC PANEL: CPT

## 2021-01-11 PROCEDURE — 74011250636 HC RX REV CODE- 250/636: Performed by: INTERNAL MEDICINE

## 2021-01-11 PROCEDURE — 74011250637 HC RX REV CODE- 250/637: Performed by: INTERNAL MEDICINE

## 2021-01-11 PROCEDURE — 74011636637 HC RX REV CODE- 636/637: Performed by: INTERNAL MEDICINE

## 2021-01-11 PROCEDURE — 82962 GLUCOSE BLOOD TEST: CPT

## 2021-01-11 PROCEDURE — 85379 FIBRIN DEGRADATION QUANT: CPT

## 2021-01-11 PROCEDURE — 36415 COLL VENOUS BLD VENIPUNCTURE: CPT

## 2021-01-11 PROCEDURE — 74011636637 HC RX REV CODE- 636/637: Performed by: HOSPITALIST

## 2021-01-11 PROCEDURE — 74011000258 HC RX REV CODE- 258: Performed by: HOSPITALIST

## 2021-01-11 PROCEDURE — 74011000250 HC RX REV CODE- 250: Performed by: HOSPITALIST

## 2021-01-11 RX ADMIN — ASPIRIN 81 MG: 81 TABLET, CHEWABLE ORAL at 08:20

## 2021-01-11 RX ADMIN — ENOXAPARIN SODIUM 40 MG: 40 INJECTION SUBCUTANEOUS at 12:08

## 2021-01-11 RX ADMIN — Medication 1 CAPSULE: at 22:33

## 2021-01-11 RX ADMIN — Medication 1 CAPSULE: at 08:20

## 2021-01-11 RX ADMIN — INSULIN LISPRO 7 UNITS: 100 INJECTION, SOLUTION INTRAVENOUS; SUBCUTANEOUS at 17:12

## 2021-01-11 RX ADMIN — ENOXAPARIN SODIUM 40 MG: 40 INJECTION SUBCUTANEOUS at 00:54

## 2021-01-11 RX ADMIN — Medication 1 TABLET: at 17:12

## 2021-01-11 RX ADMIN — INSULIN LISPRO 7 UNITS: 100 INJECTION, SOLUTION INTRAVENOUS; SUBCUTANEOUS at 13:21

## 2021-01-11 RX ADMIN — INSULIN GLARGINE 15 UNITS: 100 INJECTION, SOLUTION SUBCUTANEOUS at 22:32

## 2021-01-11 RX ADMIN — FAMOTIDINE 20 MG: 20 TABLET, FILM COATED ORAL at 08:19

## 2021-01-11 RX ADMIN — LISINOPRIL 20 MG: 20 TABLET ORAL at 08:22

## 2021-01-11 RX ADMIN — FAMOTIDINE 20 MG: 20 TABLET, FILM COATED ORAL at 22:33

## 2021-01-11 RX ADMIN — REMDESIVIR 100 MG: 5 INJECTION INTRAVENOUS at 14:59

## 2021-01-11 RX ADMIN — Medication 1 TABLET: at 08:17

## 2021-01-11 RX ADMIN — INSULIN LISPRO 2 UNITS: 100 INJECTION, SOLUTION INTRAVENOUS; SUBCUTANEOUS at 07:30

## 2021-01-11 RX ADMIN — HYDROCHLOROTHIAZIDE 25 MG: 25 TABLET ORAL at 08:19

## 2021-01-11 RX ADMIN — INSULIN LISPRO 3 UNITS: 100 INJECTION, SOLUTION INTRAVENOUS; SUBCUTANEOUS at 22:32

## 2021-01-11 RX ADMIN — DEXAMETHASONE 6 MG: 4 TABLET ORAL at 08:20

## 2021-01-11 NOTE — PROGRESS NOTES
TRANSFER - OUT REPORT:    Verbal report given to [facility] RN(name) on Sheba Valladares  being transferred to  (unit) for routine progression of care       Report consisted of patients Situation, Background, Assessment and   Recommendations(SBAR). Information from the following report(s) SBAR, Kardex and MAR was reviewed with the receiving nurse. Lines:   Peripheral IV 01/09/21 Left Antecubital (Active)   Site Assessment Clean, dry, & intact 01/11/21 0859   Phlebitis Assessment 0 01/11/21 0859   Infiltration Assessment 0 01/11/21 0859   Dressing Status Clean, dry, & intact 01/11/21 0859   Dressing Type Transparent;Tape 01/10/21 0801   Hub Color/Line Status Pink;Flushed;Capped 01/10/21 0801   Action Taken Open ports on tubing capped 01/10/21 0801   Alcohol Cap Used Yes 01/10/21 0801        Opportunity for questions and clarification was provided.       Patient transported with:   Registered Nurse

## 2021-01-11 NOTE — PROGRESS NOTES
Pt ambulating in the room asymptomatically, with no SOB, reports she had desaturated at home but only knew it because of her pulse oximeter. Pt verbally discussed monitoring at home, with increased attention to saturation despite no symptoms, and how to manage. Pt comfortable with current situation, no needs for PT. Pt ambulating in room without difficulty or loss of balance. PT orders completed, signing off.      Ashlee Canales, DPT, PT

## 2021-01-11 NOTE — PROGRESS NOTES
[facility]  Hospitalist Group                                                                                          Hospitalist Progress Note  Bear Lopez MD  Answering service: [phone] from in house phone        Date of Service:  2021  NAME:  Pastor Smith  :    MRN:  [id number]      Admission Summary:   Sherly Ruelas is a 72 y.o. female who presents with past medical history of diabetes mellitus, hypertension, dyslipidemia is coming the hospital chief complaint of shortness of breath and also generalized weakness. Patient reports she had exposure to her grandson who tested positive for COVID-19. She reports shortness of breath is worse with minimal exertion and also has cough without any phlegm. She also reports feeling weak in general involving all 4 extremities. She also reports fatigue. She reports myalgias along with chills. Does not report any fever at home. Does not report any abdominal pain, nausea or vomiting. Denies focal weakness, tingling or numbness. Denies long distance travel, trauma rash over the chest.     On arrival to ED, she was noted to have hypoxia and required 2 L oxygen by nasal cannula. On labs CBC was within normal limits. CMP showed elevated ALT of 92 and AST of 144.   Troponin was 0.05.         Interval history / Subjective:   Seen and examined no complaints on 2 L nasal cannula breathing well  Continue current management discussed with the patient that if we can wean off oxygen tomorrow she may be able to leave the hospital day 3 of remdesivir and getting Decadron as well  If we cannot wean her off she might need to go home with oxygen  Covid positive since      Assessment & Plan:     Suspected COVID-19 pneumonia  Mild hypoxia on 2 L oxygen nasal cannula  -Patient desaturated to about 89% and had to be started on 2 L nasal cannula but is comfortable and not using accessory muscles  -Had exposure to grandson about 5 days ago  -Continue Decadron and remdesivir  -Consider starting on Decadron and remdesivir if positive. nc as well if positive. -D-dimer is slightly elevated at 0.7. Check D-dimer daily anticoagulation as per coated Covid process protocol currently 40 mg of Lovenox twice daily  -Antibiotics given as pro Hakan normal  -Continue oxygen by nasal cannula currently she is on 2 L  -Continue Lovenox for DVT prophylaxis     Mild transaminitis likely due to COVID-19  -Check CMP in a.m. CMP will need to be monitored daily if started on remdesivir.     Diabetes mellitus type 2  -On Metformin and Trulicity at home  -Start insulin sliding scale with blood sugar checks. -BS Expected to go high will start basal insulin     Hypertension  Dyslipidemia-  need to take statin   Obesity  -Resume home lisinopril and HCTZ        Code status: Full  DVT prophylaxis: Lovenox    Care Plan discussed with: Patient/Family and Nurse  Anticipated Disposition: Home w/Family  Anticipated Discharge:24 [address] [facility] Problems  Date Reviewed: 10/16/2012          Codes Class Noted POA    COVID-19 virus infection ICD-10-CM: U07.1  ICD-9-CM: 079.89  1/9/2021 Unknown                Review of Systems:   A comprehensive review of systems was negative. Xr Chest Port    Result Date: 1/9/2021  IMPRESSION: No acute cardiopulmonary process. Vital Signs:    Last 24hrs VS reviewed since prior progress note.  Most recent are:  Visit Vitals  /76 (BP 1 Location: Left arm, BP Patient Position: At rest)   Pulse 82   Temp 98.3 °F (36.8 °C)   Resp 17   Ht 5' 6\" (1.676 m)   Wt 95.8 kg (211 lb 3.2 oz)   SpO2 95%   BMI 34.09 kg/m²       No intake or output data in the 24 hours ending 01/11/21 1214     Physical Examination:     I had a face to face encounter with this patient and independently examined them on 1/11/2021 as outlined below:          Constitutional:  No acute distress, cooperative, pleasant    ENT:  Oral mucosa moist, oropharynx benign. Resp:  CTA bilaterally. No wheezing/rhonchi/rales. No accessory muscle use   CV:  Regular rhythm, normal rate, no murmurs, gallops, rubs    GI:  Soft, non distended, non tender. normoactive bowel sounds, no hepatosplenomegaly     Musculoskeletal:  No edema, warm, 2+ pulses throughout    Neurologic:  Moves all extremities. AAOx3, CN II-XII reviewed     Psych:  Good insight, Not anxious nor agitated. Data Review:    I personally reviewed  Image and labs      Labs:     Recent Labs     01/10/21  0420 01/09/21  0930   WBC 4.3 4.2   HGB 13.4 14.1   HCT 41.8 43.0   * 152     Recent Labs     01/11/21  0624 01/10/21  0351 01/09/21  0930    138 135*   K 3.8 3.7 3.5    107 103   CO2 27 24 25   BUN 22* 18 13   CREA 0.91 0.73 0.96   * 229* 231*   CA 9.4 8.9 9.0   MG  --   --  1.7     Recent Labs     01/11/21  0624 01/10/21  0351 01/09/21  0930   ALT 62 78 92*   AP 45 46 50   TBILI 0.4 0.5 0.7   TP 6.6 6.7 7.2   ALB 3.3* 3.3* 3.8   GLOB 3.3 3.4 3.4     Recent Labs     01/10/21  0420 01/09/21  0930   INR 1.1 1.0   PTP 11.4* 10.9   APTT 29.8 26.4      Recent Labs     01/09/21 0930   FERR 405*      No results found for: FOL, RBCF   No results for input(s): PH, PCO2, PO2 in the last 72 hours.   Recent Labs     01/09/21 0930   TROIQ <0.05     Lab Results   Component Value Date/Time    Cholesterol, total 204 (H) 09/29/2011 02:28 AM    HDL Cholesterol 54 09/29/2011 02:28 AM    LDL, calculated 129 (H) 09/29/2011 02:28 AM    Triglyceride 104 09/29/2011 02:28 AM    CHOL/HDL Ratio 3.1 03/09/2010 03:33 PM     Lab Results   Component Value Date/Time    Glucose (POC) 339 (H) 01/11/2021 11:57 AM    Glucose (POC) 228 (H) 01/11/2021 06:11 AM    Glucose (POC) 323 (H) 01/10/2021 08:54 PM    Glucose (POC) 327 (H) 01/10/2021 04:24 PM    Glucose (POC) 308 (H) 01/10/2021 10:43 AM     No results found for: COLOR, APPRN, SPGRU, [address], AFUA, PROTU, GLUCU, KETU, BILU, UROU, LONI, [address], [facility] [place], [address] [place] [address], [facility], RBCU, CASTS, UCRY      Medications Reviewed:     Current Facility-Administered Medications   Medication Dose Route Frequency    insulin glargine (LANTUS) injection 15 Units  15 Units SubCUTAneous QHS    aspirin chewable tablet 81 mg  81 mg Oral DAILY    calcium-vitamin D (OS-WILMA +D3) 500 mg-200 unit per tablet 1 Tab  1 Tab Oral BID WITH MEALS    acetaminophen (TYLENOL) tablet 650 mg  650 mg Oral Q6H PRN    Or    acetaminophen (TYLENOL) suppository 650 mg  650 mg Rectal Q6H PRN    guaiFENesin-dextromethorphan (ROBITUSSIN DM) 100-10 mg/5 mL syrup 5 mL  5 mL Oral Q4H PRN    dexAMETHasone (DECADRON) tablet 6 mg  6 mg Oral DAILY    insulin lispro (HUMALOG) injection   SubCUTAneous AC&HS    glucose chewable tablet 16 g  4 Tab Oral PRN    dextrose (D50W) injection syrg 12.5-25 g  12.5-25 g IntraVENous PRN    glucagon (GLUCAGEN) injection 1 mg  1 mg IntraMUSCular PRN    ondansetron (ZOFRAN ODT) tablet 4 mg  4 mg Oral Q6H PRN    famotidine (PEPCID) tablet 20 mg  20 mg Oral BID    remdesivir 100 mg in 0.9% sodium chloride 250 mL IVPB  100 mg IntraVENous Q24H    enoxaparin (LOVENOX) injection 40 mg  40 mg SubCUTAneous Q12H    lisinopriL (PRINIVIL, ZESTRIL) tablet 20 mg  20 mg Oral DAILY    And    hydroCHLOROthiazide (HYDRODIURIL) tablet 25 mg  25 mg Oral DAILY    fish oil-omega-3 fatty acids 340-1,000 mg capsule 1 Cap  1 Cap Oral Q12H     ______________________________________________________________________  EXPECTED LENGTH OF STAY: 5d 12h  ACTUAL LENGTH OF STAY:          2                 Kulwant Arellano MD

## 2021-01-11 NOTE — PROGRESS NOTES
Bedside shift change report given to [employer] (oncoming nurse) by Luis Dill (offgoing nurse). Report included the following information SBAR, Kardex, ED Summary, Intake/Output, MAR, Accordion, Recent Results, Med Rec Status and Cardiac Rhythm NSR.

## 2021-01-11 NOTE — PROGRESS NOTES
Bedside shift change report given to [address] [place] [address] (oncoming nurse) by Marlon Tavera RN (offgoing nurse). Report included the following information SBAR, Intake/Output, MAR, Med Rec Status and Cardiac Rhythm nsr.

## 2021-01-11 NOTE — PROGRESS NOTES
TRANSFER - IN REPORT:    Verbal report received from kevon(name) on Mardeen Raj  being received from [facility](unit) for routine progression of care      Report consisted of patients Situation, Background, Assessment and   Recommendations(SBAR). Information from the following report(s) SBAR, Kardex and Intake/Output was reviewed with the receiving nurse. Opportunity for questions and clarification was provided. Assessment completed upon patients arrival to unit and care assumed.

## 2021-01-12 VITALS
WEIGHT: 211.2 LBS | HEIGHT: 66 IN | OXYGEN SATURATION: 95 % | HEART RATE: 68 BPM | RESPIRATION RATE: 15 BRPM | BODY MASS INDEX: 33.94 KG/M2 | TEMPERATURE: 98.5 F | DIASTOLIC BLOOD PRESSURE: 85 MMHG | SYSTOLIC BLOOD PRESSURE: 136 MMHG

## 2021-01-12 LAB
ALBUMIN SERPL-MCNC: 3.4 G/DL (ref 3.5–5)
ALBUMIN/GLOB SERPL: 1 {RATIO} (ref 1.1–2.2)
ALP SERPL-CCNC: 48 U/L (ref 45–117)
ALT SERPL-CCNC: 62 U/L (ref 12–78)
ANION GAP SERPL CALC-SCNC: 6 MMOL/L (ref 5–15)
APTT PPP: 27.2 SEC (ref 22.1–31)
AST SERPL-CCNC: 40 U/L (ref 15–37)
BILIRUB SERPL-MCNC: 0.3 MG/DL (ref 0.2–1)
BUN SERPL-MCNC: 22 MG/DL (ref 6–20)
BUN/CREAT SERPL: 30 (ref 12–20)
CALCIUM SERPL-MCNC: 9.9 MG/DL (ref 8.5–10.1)
CHLORIDE SERPL-SCNC: 102 MMOL/L (ref 97–108)
CO2 SERPL-SCNC: 27 MMOL/L (ref 21–32)
COMMENT, HOLDF: NORMAL
CREAT SERPL-MCNC: 0.73 MG/DL (ref 0.55–1.02)
D DIMER PPP FEU-MCNC: 0.22 MG/L FEU (ref 0–0.65)
FIBRINOGEN PPP-MCNC: 373 MG/DL (ref 200–475)
GLOBULIN SER CALC-MCNC: 3.5 G/DL (ref 2–4)
GLUCOSE BLD STRIP.AUTO-MCNC: 252 MG/DL (ref 65–100)
GLUCOSE BLD STRIP.AUTO-MCNC: 291 MG/DL (ref 65–100)
GLUCOSE SERPL-MCNC: 265 MG/DL (ref 65–100)
INR PPP: 1 (ref 0.9–1.1)
POTASSIUM SERPL-SCNC: 3.7 MMOL/L (ref 3.5–5.1)
PROT SERPL-MCNC: 6.9 G/DL (ref 6.4–8.2)
PROTHROMBIN TIME: 10.6 SEC (ref 9–11.1)
SAMPLES BEING HELD,HOLD: NORMAL
SERVICE CMNT-IMP: ABNORMAL
SERVICE CMNT-IMP: ABNORMAL
SODIUM SERPL-SCNC: 135 MMOL/L (ref 136–145)
THERAPEUTIC RANGE,PTTT: NORMAL SECS (ref 58–77)

## 2021-01-12 PROCEDURE — 74011000250 HC RX REV CODE- 250: Performed by: HOSPITALIST

## 2021-01-12 PROCEDURE — 82962 GLUCOSE BLOOD TEST: CPT

## 2021-01-12 PROCEDURE — 85384 FIBRINOGEN ACTIVITY: CPT

## 2021-01-12 PROCEDURE — 74011250637 HC RX REV CODE- 250/637: Performed by: INTERNAL MEDICINE

## 2021-01-12 PROCEDURE — 74011636637 HC RX REV CODE- 636/637: Performed by: INTERNAL MEDICINE

## 2021-01-12 PROCEDURE — 85379 FIBRIN DEGRADATION QUANT: CPT

## 2021-01-12 PROCEDURE — 74011250636 HC RX REV CODE- 250/636: Performed by: HOSPITALIST

## 2021-01-12 PROCEDURE — 85730 THROMBOPLASTIN TIME PARTIAL: CPT

## 2021-01-12 PROCEDURE — 74011000258 HC RX REV CODE- 258: Performed by: HOSPITALIST

## 2021-01-12 PROCEDURE — 36415 COLL VENOUS BLD VENIPUNCTURE: CPT

## 2021-01-12 PROCEDURE — 80053 COMPREHEN METABOLIC PANEL: CPT

## 2021-01-12 PROCEDURE — 85610 PROTHROMBIN TIME: CPT

## 2021-01-12 PROCEDURE — 74011250637 HC RX REV CODE- 250/637: Performed by: HOSPITALIST

## 2021-01-12 PROCEDURE — 74011250636 HC RX REV CODE- 250/636: Performed by: INTERNAL MEDICINE

## 2021-01-12 RX ORDER — DEXAMETHASONE 6 MG/1
TABLET ORAL
Qty: 6 TAB | Refills: 0 | Status: SHIPPED | OUTPATIENT
Start: 2021-01-12 | End: 2021-01-18

## 2021-01-12 RX ADMIN — DEXAMETHASONE 6 MG: 4 TABLET ORAL at 09:13

## 2021-01-12 RX ADMIN — INSULIN LISPRO 5 UNITS: 100 INJECTION, SOLUTION INTRAVENOUS; SUBCUTANEOUS at 12:02

## 2021-01-12 RX ADMIN — ENOXAPARIN SODIUM 40 MG: 40 INJECTION SUBCUTANEOUS at 12:02

## 2021-01-12 RX ADMIN — ASPIRIN 81 MG: 81 TABLET, CHEWABLE ORAL at 09:13

## 2021-01-12 RX ADMIN — ENOXAPARIN SODIUM 40 MG: 40 INJECTION SUBCUTANEOUS at 00:00

## 2021-01-12 RX ADMIN — Medication 1 TABLET: at 07:26

## 2021-01-12 RX ADMIN — LISINOPRIL 20 MG: 20 TABLET ORAL at 09:13

## 2021-01-12 RX ADMIN — FAMOTIDINE 20 MG: 20 TABLET, FILM COATED ORAL at 09:13

## 2021-01-12 RX ADMIN — REMDESIVIR 100 MG: 5 INJECTION INTRAVENOUS at 12:02

## 2021-01-12 RX ADMIN — INSULIN LISPRO 5 UNITS: 100 INJECTION, SOLUTION INTRAVENOUS; SUBCUTANEOUS at 07:26

## 2021-01-12 RX ADMIN — Medication 1 CAPSULE: at 09:13

## 2021-01-12 RX ADMIN — HYDROCHLOROTHIAZIDE 25 MG: 25 TABLET ORAL at 09:13

## 2021-01-12 NOTE — DISCHARGE SUMMARY
Discharge Summary       PATIENT ID: Karina Kan  MRN: [id number]   YOB: 1950    DATE OF ADMISSION: 1/9/2021  9:11 AM    DATE OF DISCHARGE: 1/12/21   PRIMARY CARE PROVIDER: Tiki Yu MD     ATTENDING PHYSICIAN: Tesha Kwong  DISCHARGING PROVIDER: Jose R Salinas MD    To contact this individual call [phone] and ask the  to page. If unavailable ask to be transferred the Adult Hospitalist Department.     CONSULTATIONS: IP CONSULT TO HOSPITALIST    PROCEDURES/SURGERIES: * No surgery found *    [address] COURSE:   Jackelyn Carter is a 72 y.o. female who presents with past medical history of diabetes mellitus, hypertension, dyslipidemia is coming the hospital chief complaint of shortness of breath and also generalized weakness.  Patient reports she had exposure to her grandson who tested positive for COVID-19.  She reports shortness of breath is worse with minimal exertion and also has cough without any phlegm.  She also reports feeling weak in general involving all 4 extremities.  She also reports fatigue.  She reports myalgias along with chills.  Does not report any fever at home. [facility] not report any abdominal pain, nausea or vomiting.  Denies focal weakness, tingling or numbness.  Denies long distance travel, trauma rash over the chest.      Assessment & Plan:       COVID-19 pneumonia  Mild hypoxia on 2 L oxygen nasal cannula- now in RA  Got decadron and ramdesivir 4 days      Mild transaminitis likely due to COVID-1     Diabetes mellitus type 2  -On Metformin and Trulicity at home  -restart after d/c     Hypertension  Dyslipidemia-  need to take statin   Obesity  -Resume home lisinopril and HCTZ                   DISCHARGE DIAGNOSES / PLAN:      1. D/c home      ADDITIONAL CARE RECOMMENDATIONS:        PENDING TEST RESULTS:   At the time of discharge the following test results are still pending:     FOLLOW UP APPOINTMENTS:    Follow-up Information Follow up With Specialties Details Why Contact Info    Adriano Padilla MD Family Medicine On 1/18/2021 [facility] follow up appt has been scheduled for Jan 18 @ 3:15PM [address]  [address] [facility]  [phone]               DIET: Cardiac Diet    ACTIVITY: Activity as tolerated    WOUND CARE:     EQUIPMENT needed:       DISCHARGE MEDICATIONS:  Current Discharge Medication List      START taking these medications    Details   dexAMETHasone (DECADRON) 6 mg tablet 6 days  Qty: 6 Tab, Refills: 0         CONTINUE these medications which have NOT CHANGED    Details   benazepril-hydroCHLOROthiazide (LOTENSIN HCT) 10-12.5 mg per tablet Take 1 Tab by mouth daily. cholecalciferol (VITAMIN D3) (1000 Units /25 mcg) tablet Take 1,000 Units by mouth daily. metFORMIN ER (GLUCOPHAGE XR) 500 mg tablet Take 1,000 mg by mouth two (2) times daily (with meals). dulaglutide (Trulicity) 1.5 QF/1.2 mL sub-q pen 1.5 mg by SubCUTAneous route Every Thursday. zinc sulfate (Zinc-220) 220 (50) mg capsule Take 220 mg by mouth daily. ascorbic acid, vitamin C, (VITAMIN C) 500 mg tablet Take 500 mg by mouth daily. LOVAZA 1 gram capsule TAKE ONE CAPSULE BY MOUTH TWICE A DAY  Qty: 60 Cap, Refills: 0    Comments: FILL RX AND CALL PATIENT WHEN READY         STOP taking these medications       aspirin 81 mg chewable tablet Comments:   Reason for Stopping:         calcium-vitamin D (CALCIUM 500+D) 500 mg(1,250mg) -200 unit per tablet Comments:   Reason for Stopping:                 NOTIFY YOUR PHYSICIAN FOR ANY OF THE FOLLOWING:   Fever over 101 degrees for 24 hours. Chest pain, shortness of breath, fever, chills, nausea, vomiting, diarrhea, change in mentation, falling, weakness, bleeding. Severe pain or pain not relieved by medications. Or, any other signs or symptoms that you may have questions about.     DISPOSITION:  x  Home With:   OT  PT  HH  RN       Long term SNF/Inpatient Rehab Independent/assisted living    Hospice    Other:       PATIENT CONDITION AT DISCHARGE:     Functional status    Poor     Deconditioned    x Independent      Cognition   x  Lucid     Forgetful     Dementia      Catheters/lines (plus indication)    Mendoza     PICC     PEG    x None      Code status    x Full code     DNR      PHYSICAL EXAMINATION AT DISCHARGE:  General:          Alert, cooperative, no distress, appears stated age. HEENT:           Atraumatic, anicteric sclerae, pink conjunctivae                          No oral ulcers, mucosa moist, throat clear, dentition fair  Neck:               Supple, symmetrical  Lungs:             Clear to auscultation bilaterally. No Wheezing or Rhonchi. No rales. Chest wall:      No tenderness  No Accessory muscle use. Heart:              Regular  rhythm,  No  murmur   No edema  Abdomen:        Soft, non-tender. Not distended. Bowel sounds normal  Extremities:     No cyanosis. No clubbing,                            Skin turgor normal, Capillary refill normal  Skin:                Not pale. Not Jaundiced  No rashes   Psych:             Not anxious or agitated.   Neurologic:      Alert, moves all extremities, answers questions appropriately and responds to commands       CHRONIC MEDICAL DIAGNOSES:  Problem List as of 1/12/2021 Date Reviewed: 1/12/2021          Codes Class Noted - Resolved    COVID-19 virus infection ICD-10-CM: U07.1  ICD-9-CM: 079.89  1/9/2021 - Present        Unspecified essential hypertension ICD-10-CM: I10  ICD-9-CM: 401.9  9/29/2011 - Present        Psoriasis ICD-10-CM: L40.9  ICD-9-CM: 696.1  9/29/2011 - Present        Hyperlipidemia ICD-10-CM: E78.5  ICD-9-CM: 272.4  5/19/2009 - Present              Greater than 30  minutes were spent with the patient on counseling and coordination of care    Signed:   Isrrael Tovar MD  1/12/2021  11:19 AM

## 2021-01-12 NOTE — ROUTINE PROCESS
[facility] follow-up PCP transitional care appointment has been scheduled with Dr. Donna Hilario for Jan 18 @ 315PM. Pending patient discharge.   Aliya Wallace, Care Management Specialist.

## 2021-01-12 NOTE — DISCHARGE INSTRUCTIONS
Discharge Instructions       PATIENT ID: Karsten Landry  MRN: [id number]   YOB: 1950    DATE OF ADMISSION: 1/9/2021  9:11 AM    DATE OF DISCHARGE: 1/12/2021    PRIMARY CARE PROVIDER: Baylee Barriga MD     ATTENDING PHYSICIAN: Jahaira Davies MD  DISCHARGING PROVIDER: Caty Burgess MD    To contact this individual call [phone] and ask the  to page. If unavailable ask to be transferred the Adult Hospitalist Department. DISCHARGE DIAGNOSES COVID 19    CONSULTATIONS: IP CONSULT TO HOSPITALIST    PROCEDURES/SURGERIES: * No surgery found *    PENDING TEST RESULTS:   At the time of discharge the following test results are still pending:     FOLLOW UP APPOINTMENTS:   Follow-up Information     Follow up With Specialties Details Why Contact Info    Baylee Barriga MD Family Medicine In 1 week  [address] [place] [address]   [address] [place] [address]  [phone]             ADDITIONAL CARE RECOMMENDATIONS:     DIET: Cardiac Diet and Diabetic Diet    ACTIVITY: Activity as tolerated    WOUND CARE:     EQUIPMENT needed:       Radiology      DISCHARGE MEDICATIONS:   See Medication Reconciliation Form    · It is important that you take the medication exactly as they are prescribed. · Keep your medication in the bottles provided by the pharmacist and keep a list of the medication names, dosages, and times to be taken in your wallet. · Do not take other medications without consulting your doctor. NOTIFY YOUR PHYSICIAN FOR ANY OF THE FOLLOWING:   Fever over 101 degrees for 24 hours. Chest pain, shortness of breath, fever, chills, nausea, vomiting, diarrhea, change in mentation, falling, weakness, bleeding. Severe pain or pain not relieved by medications. Or, any other signs or symptoms that you may have questions about.       DISPOSITION:   x Home With:   OT  PT  HH  RN       SNF/Inpatient Rehab/LTAC    Independent/assisted living    Hospice    Other:     CDMP Checked:   Yes x     PROBLEM LIST Updated:  Yes x       Signed:   Luis Thakkar MD  1/12/2021  7:43 AM    Advance Care Planning  People with COVID-19 may have no symptoms, mild symptoms, such as fever, cough, and shortness of breath or they may have more severe illness, developing severe and fatal pneumonia. As a result, Advance Care Planning with attention to naming a health care decision maker (someone you trust to make healthcare decisions for you if you could not speak for yourself) and sharing other health care preferences is important BEFORE a possible health crisis. Please contact your Primary Care Provider to discuss Advance Care Planning. Preventing the Spread of Coronavirus Disease 2019 in Homes and Residential Communities  For the most recent information go to [employer].    Prevention steps for People with confirmed or suspected COVID-19 (including persons under investigation) who do not need to be hospitalized  and   People with confirmed COVID-19 who were hospitalized and determined to be medically stable to go home    Your healthcare provider and public health staff will evaluate whether you can be cared for at home. If it is determined that you do not need to be hospitalized and can be isolated at home, you will be monitored by staff from your local or state health department. You should follow the prevention steps below until a healthcare provider or local or state health department says you can return to your normal activities. Stay home except to get medical care  People who are mildly ill with COVID-19 are able to isolate at home during their illness. You should restrict activities outside your home, except for getting medical care. Do not go to work, school, or public areas. Avoid using public transportation, ride-sharing, or taxis.   Separate yourself from other people and animals in your home  People: As much as possible, you should stay in a specific room and away from other people in your home. Also, you should use a separate bathroom, if available. Animals: You should restrict contact with pets and other animals while you are sick with COVID-19, just like you would around other people. Although there have not been reports of pets or other animals becoming sick with COVID-19, it is still recommended that people sick with COVID-19 limit contact with animals until more information is known about the virus. When possible, have another member of your household care for your animals while you are sick. If you are sick with COVID-19, avoid contact with your pet, including petting, snuggling, being kissed or licked, and sharing food. If you must care for your pet or be around animals while you are sick, wash your hands before and after you interact with pets and wear a facemask. Call ahead before visiting your doctor  If you have a medical appointment, call the healthcare provider and tell them that you have or may have COVID-19. This will help the healthcare providers office take steps to keep other people from getting infected or exposed. Wear a facemask  You should wear a facemask when you are around other people (e.g., sharing a room or vehicle) or pets and before you enter a healthcare providers office. If you are not able to wear a facemask (for example, because it causes trouble breathing), then people who live with you should not stay in the same room with you, or they should wear a facemask if they enter your room. Cover your coughs and sneezes  Cover your mouth and nose with a tissue when you cough or sneeze. Throw used tissues in a lined trash can. Immediately wash your hands with soap and water for at least 20 seconds or, if soap and water are not available, clean your hands with an alcohol-based hand  that contains at least 60% alcohol.   Clean your hands often  Wash your hands often with soap and water for at least 20 seconds, especially after blowing your nose, coughing, or sneezing; going to the bathroom; and before eating or preparing food. If soap and water are not readily available, use an alcohol-based hand  with at least 60% alcohol, covering all surfaces of your hands and rubbing them together until they feel dry.  Soap and water are the best option if hands are visibly dirty. Avoid touching your eyes, nose, and mouth with unwashed hands.  Avoid sharing personal household items  You should not share dishes, drinking glasses, cups, eating utensils, towels, or bedding with other people or pets in your home. After using these items, they should be washed thoroughly with soap and water.  Clean all “high-touch” surfaces everyday  High touch surfaces include counters, tabletops, doorknobs, bathroom fixtures, toilets, phones, keyboards, tablets, and bedside tables. Also, clean any surfaces that may have blood, stool, or body fluids on them. Use a household cleaning spray or wipe, according to the label instructions. Labels contain instructions for safe and effective use of the cleaning product including precautions you should take when applying the product, such as wearing gloves and making sure you have good ventilation during use of the product.  Monitor your symptoms  Seek prompt medical attention if your illness is worsening (e.g., difficulty breathing). Before seeking care, call your healthcare provider and tell them that you have, or are being evaluated for, COVID-19. Put on a facemask before you enter the facility. These steps will help the healthcare provider’s office to keep other people in the office or waiting room from getting infected or exposed. Ask your healthcare provider to call the local or formerly [facility] health department. Persons who are placed under active monitoring or facilitated self-monitoring should follow instructions provided by their local health department or occupational health professionals, as appropriate. When  working with your local health department check their available hours. If you have a medical emergency and need to call 911, notify the dispatch personnel that you have, or are being evaluated for COVID-19. If possible, put on a facemask before emergency medical services arrive. Discontinuing home isolation  Patients with confirmed COVID-19 should remain under home isolation precautions until the risk of secondary transmission to others is thought to be low. The decision to discontinue home isolation precautions should be made on a case-by-case basis, in consultation with healthcare providers and state and local health departments.

## 2021-01-12 NOTE — PROGRESS NOTES
CM noted patient would discharge home with oxygen. Referral sent to [address], they accepted. Patient will transport her self home.     Laina Fiore [facility]

## 2021-01-12 NOTE — ROUTINE PROCESS
Occupational Therapy 5508 -  
[phone] Orders acknowledged and chart reviewed in prep for OT evaluation. Discussed patient case with PT who reports patient up in room with no assist and is asymptomatic. Noted patient with plans to D/C today. Will sign off at this time. Thank you. Please re-consult should patient status change. Octavia Lyon MS, OTR/L

## 2021-01-13 ENCOUNTER — PATIENT OUTREACH (OUTPATIENT)
Dept: CASE MANAGEMENT | Age: 71
End: 2021-01-13

## 2021-01-13 NOTE — PROGRESS NOTES
Patient contacted regarding [id number] diagnosis\". Discussed COVID-19 related testing which was available at this time. Test results were positive. Patient informed of results, if available? no     Care Transition Nurse/ Ambulatory Care Manager contacted the family by telephone to perform post discharge assessment. Call within 2 business days of discharge: Yes Verified name and  with family as identifiers. Provided introduction to self, and explanation of the CTN/ACM role, and reason for call due to risk factors for infection and/or exposure to COVID-19. Symptoms reviewed with family who verbalized the following symptoms: no new symptoms and no worsening symptoms      Due to no new or worsening symptoms encounter was not routed to provider for escalation. Discussed follow-up appointments. If no appointment was previously scheduled, appointment scheduling offered:  [facility] follow up appointment(s): No future appointments. Non-[facility] follow up appointment(s): PCP 21 @ [phone]     Advance Care Planning:   Does patient have an Advance Directive:  not on file. Patient has following risk factors of: diabetes. CTN/ACM reviewed discharge instructions, medical action plan and red flags such as increased shortness of breath, increasing fever and signs of decompensation with family who verbalized understanding. Discussed exposure protocols and quarantine with CDC Guidelines What to do if you are sick with coronavirus disease .  Family was given an opportunity for questions and concerns. The family agrees to contact the [facility] exposure line [phone], [facility] [address] [place] [address]  ([phone] and PCP office for questions related to their healthcare. CTN/ACM provided contact information for future needs.     Reviewed and educated family on any new and changed medications related to discharge diagnosis     Patient/family/caregiver given information for T[facility] Watson and agrees to enroll no  Patient's preferred e-mail: declines   Patient's preferred phone number: declines  Based on Loop alert triggers, patient will be contacted by nurse care manager for worsening symptoms. Plan for follow-up call in 5-7 days based on severity of symptoms and risk factors.

## 2021-01-20 ENCOUNTER — APPOINTMENT (OUTPATIENT)
Dept: GENERAL RADIOLOGY | Age: 71
End: 2021-01-20
Attending: EMERGENCY MEDICINE
Payer: MEDICARE

## 2021-01-20 ENCOUNTER — HOSPITAL ENCOUNTER (EMERGENCY)
Age: 71
Discharge: HOME | End: 2021-01-20
Attending: EMERGENCY MEDICINE
Payer: MEDICARE

## 2021-01-20 ENCOUNTER — APPOINTMENT (OUTPATIENT)
Dept: CT IMAGING | Age: 71
End: 2021-01-20
Attending: EMERGENCY MEDICINE
Payer: MEDICARE

## 2021-01-20 VITALS
TEMPERATURE: 97.7 F | OXYGEN SATURATION: 90 % | SYSTOLIC BLOOD PRESSURE: 128 MMHG | HEART RATE: 106 BPM | RESPIRATION RATE: 16 BRPM | DIASTOLIC BLOOD PRESSURE: 85 MMHG

## 2021-01-20 DIAGNOSIS — E86.0 DEHYDRATION: ICD-10-CM

## 2021-01-20 DIAGNOSIS — R53.83 FATIGUE, UNSPECIFIED TYPE: ICD-10-CM

## 2021-01-20 DIAGNOSIS — R73.9 HYPERGLYCEMIA: ICD-10-CM

## 2021-01-20 DIAGNOSIS — R55 SYNCOPE AND COLLAPSE: ICD-10-CM

## 2021-01-20 DIAGNOSIS — U07.1 COVID-19 VIRUS INFECTION: Primary | ICD-10-CM

## 2021-01-20 LAB
ALBUMIN SERPL-MCNC: 2.8 G/DL (ref 3.5–5)
ALBUMIN/GLOB SERPL: 0.7 {RATIO} (ref 1.1–2.2)
ALP SERPL-CCNC: 57 U/L (ref 45–117)
ALT SERPL-CCNC: 54 U/L (ref 12–78)
ANION GAP SERPL CALC-SCNC: 9 MMOL/L (ref 5–15)
AST SERPL-CCNC: 31 U/L (ref 15–37)
ATRIAL RATE: 91 BPM
BASOPHILS # BLD: 0 K/UL (ref 0–0.1)
BASOPHILS NFR BLD: 0 % (ref 0–1)
BILIRUB SERPL-MCNC: 0.5 MG/DL (ref 0.2–1)
BUN SERPL-MCNC: 18 MG/DL (ref 6–20)
BUN/CREAT SERPL: 18 (ref 12–20)
CALCIUM SERPL-MCNC: 8.8 MG/DL (ref 8.5–10.1)
CALCULATED P AXIS, ECG09: 15 DEGREES
CALCULATED R AXIS, ECG10: 12 DEGREES
CALCULATED T AXIS, ECG11: 6 DEGREES
CHLORIDE SERPL-SCNC: 100 MMOL/L (ref 97–108)
CO2 SERPL-SCNC: 22 MMOL/L (ref 21–32)
COMMENT, HOLDF: NORMAL
CREAT SERPL-MCNC: 0.98 MG/DL (ref 0.55–1.02)
D DIMER PPP FEU-MCNC: 0.62 MG/L FEU (ref 0–0.65)
DIAGNOSIS, 93000: NORMAL
DIFFERENTIAL METHOD BLD: ABNORMAL
EOSINOPHIL # BLD: 0 K/UL (ref 0–0.4)
EOSINOPHIL NFR BLD: 0 % (ref 0–7)
ERYTHROCYTE [DISTWIDTH] IN BLOOD BY AUTOMATED COUNT: 12 % (ref 11.5–14.5)
GLOBULIN SER CALC-MCNC: 4 G/DL (ref 2–4)
GLUCOSE SERPL-MCNC: 379 MG/DL (ref 65–100)
HCT VFR BLD AUTO: 41.6 % (ref 35–47)
HGB BLD-MCNC: 14.1 G/DL (ref 11.5–16)
IMM GRANULOCYTES # BLD AUTO: 0.1 K/UL (ref 0–0.04)
IMM GRANULOCYTES NFR BLD AUTO: 1 % (ref 0–0.5)
LYMPHOCYTES # BLD: 1.3 K/UL (ref 0.8–3.5)
LYMPHOCYTES NFR BLD: 9 % (ref 12–49)
MAGNESIUM SERPL-MCNC: 2 MG/DL (ref 1.6–2.4)
MCH RBC QN AUTO: 28.8 PG (ref 26–34)
MCHC RBC AUTO-ENTMCNC: 33.9 G/DL (ref 30–36.5)
MCV RBC AUTO: 84.9 FL (ref 80–99)
MONOCYTES # BLD: 0.5 K/UL (ref 0–1)
MONOCYTES NFR BLD: 3 % (ref 5–13)
NEUTS SEG # BLD: 13.4 K/UL (ref 1.8–8)
NEUTS SEG NFR BLD: 87 % (ref 32–75)
NRBC # BLD: 0 K/UL (ref 0–0.01)
NRBC BLD-RTO: 0 PER 100 WBC
P-R INTERVAL, ECG05: 142 MS
PLATELET # BLD AUTO: 155 K/UL (ref 150–400)
PMV BLD AUTO: 12.4 FL (ref 8.9–12.9)
POTASSIUM SERPL-SCNC: 3.3 MMOL/L (ref 3.5–5.1)
PROT SERPL-MCNC: 6.8 G/DL (ref 6.4–8.2)
Q-T INTERVAL, ECG07: 366 MS
QRS DURATION, ECG06: 94 MS
QTC CALCULATION (BEZET), ECG08: 450 MS
RBC # BLD AUTO: 4.9 M/UL (ref 3.8–5.2)
SAMPLES BEING HELD,HOLD: NORMAL
SODIUM SERPL-SCNC: 131 MMOL/L (ref 136–145)
TROPONIN I SERPL-MCNC: <0.05 NG/ML
VENTRICULAR RATE, ECG03: 91 BPM
WBC # BLD AUTO: 15.3 K/UL (ref 3.6–11)

## 2021-01-20 PROCEDURE — 93005 ELECTROCARDIOGRAM TRACING: CPT

## 2021-01-20 PROCEDURE — 83735 ASSAY OF MAGNESIUM: CPT

## 2021-01-20 PROCEDURE — 74011250636 HC RX REV CODE- 250/636: Performed by: EMERGENCY MEDICINE

## 2021-01-20 PROCEDURE — 85025 COMPLETE CBC W/AUTO DIFF WBC: CPT

## 2021-01-20 PROCEDURE — 85379 FIBRIN DEGRADATION QUANT: CPT

## 2021-01-20 PROCEDURE — 84484 ASSAY OF TROPONIN QUANT: CPT

## 2021-01-20 PROCEDURE — 71045 X-RAY EXAM CHEST 1 VIEW: CPT

## 2021-01-20 PROCEDURE — 80053 COMPREHEN METABOLIC PANEL: CPT

## 2021-01-20 PROCEDURE — 96360 HYDRATION IV INFUSION INIT: CPT

## 2021-01-20 PROCEDURE — 36415 COLL VENOUS BLD VENIPUNCTURE: CPT

## 2021-01-20 PROCEDURE — 70450 CT HEAD/BRAIN W/O DYE: CPT

## 2021-01-20 PROCEDURE — 99284 EMERGENCY DEPT VISIT MOD MDM: CPT

## 2021-01-20 PROCEDURE — 96361 HYDRATE IV INFUSION ADD-ON: CPT

## 2021-01-20 RX ADMIN — SODIUM CHLORIDE 1000 ML: 9 INJECTION, SOLUTION INTRAVENOUS at 13:04

## 2021-01-20 NOTE — ED PROVIDER NOTES
60-year-old female presents from home via private vehicle with a complaint of weakness and syncope. Patient was in the hospital from January 9 to January 12 diagnosed with coronavirus. She was discharged home and feeling well. She was sent home with supplemental oxygen but had not been using it. Over the past couple of days she started to feel weak and fatigued. Last night she woke up on the floor of her bedroom not sure how she had gotten there and not remember falling. Did not know if she hit her head or not. Today she required a walker to get around her home which is not usual for her. She has been monitoring her O2 sats at home which have been in the low to mid 90s percent but starting last night was dipping down to 88. She reports mild cough that is nonproductive. No vomiting or diarrhea. She has had decreased appetite and decreased fluid intake.            Past Medical History:   Diagnosis Date    COVID-19     HTN 5/19/2009    Hyperlipidemia 5/19/2009    Menopause     Type II or unspecified type diabetes mellitus without mention of complication, not stated as uncontrolled 5/19/2009       Past Surgical History:   Procedure Laterality Date    HX TONSILLECTOMY           Family History:   Problem Relation Age of Onset    Hypertension Mother     Cancer Mother         carcinoid on small intestine    Cancer Father         stomach    Allergy-severe Neg Hx        Social History     Socioeconomic History    Marital status:      Spouse name: Not on file    Number of children: Not on file    Years of education: Not on file    Highest education level: Not on file   Occupational History    Not on file   Social Needs    Financial resource strain: Not on file    Food insecurity     Worry: Not on file     Inability: Not on file    Transportation needs     Medical: Not on file     Non-medical: Not on file   Tobacco Use    Smoking status: Never Smoker    Smokeless tobacco: Never Used Substance and Sexual Activity    Alcohol use: No    Drug use: No    Sexual activity: Yes   Lifestyle    Physical activity     Days per week: Not on file     Minutes per session: Not on file    Stress: Not on file   Relationships    Social connections     Talks on phone: Not on file     Gets together: Not on file     Attends Cheondoism service: Not on file     Active member of club or organization: Not on file     Attends meetings of clubs or organizations: Not on file     Relationship status: Not on file    Intimate partner violence     Fear of current or ex partner: Not on file     Emotionally abused: Not on file     Physically abused: Not on file     Forced sexual activity: Not on file   Other Topics Concern    Not on file   Social History Narrative    Not on file         ALLERGIES: Codeine    Review of Systems   Constitutional: Negative for fever. HENT: Negative for facial swelling. Eyes: Negative for visual disturbance. Respiratory: Negative for chest tightness. Cardiovascular: Negative for chest pain. Gastrointestinal: Negative for abdominal pain. Genitourinary: Negative for difficulty urinating and dysuria. Musculoskeletal: Negative for arthralgias. Skin: Negative for rash. Neurological: Negative for headaches. Hematological: Negative for adenopathy. Psychiatric/Behavioral: Negative for suicidal ideas. Vitals:    01/20/21 1238   BP: 128/85   Pulse: (!) 106   Resp: 16   Temp: 97.7 °F (36.5 °C)   SpO2: 90%            Physical Exam  Vitals signs and nursing note reviewed. Constitutional:       General: She is not in acute distress. Appearance: She is well-developed. HENT:      Head: Normocephalic and atraumatic. Eyes:      General: No scleral icterus. Conjunctiva/sclera: Conjunctivae normal.      Pupils: Pupils are equal, round, and reactive to light. Neck:      Musculoskeletal: Normal range of motion and neck supple.    Cardiovascular:      Rate and Rhythm: Tachycardia present. Heart sounds: No murmur. Pulmonary:      Effort: Pulmonary effort is normal. No respiratory distress. Breath sounds: Normal breath sounds. No wheezing. Abdominal:      General: There is no distension. Musculoskeletal: Normal range of motion. Skin:     General: Skin is warm and dry. Findings: No rash. Neurological:      Mental Status: She is alert and oriented to person, place, and time. MDM  Number of Diagnoses or Management Options  COVID-19 virus infection  Dehydration  Fatigue, unspecified type  Hyperglycemia  Syncope and collapse  Diagnosis management comments: Assessment: Patient here with known coronavirus diagnosis who is home from the hospital doing well for the past several days but began to feel increasingly weak and fatigued yesterday. O2 sats were noted to be in the upper 80s on room air. She had oxygen at home but had not been using it. She was found herself on the floor at some point in the middle of the night after possible syncopal event but she is unable to recall. Vital signs today show a slight tachycardia with O2 sats around 90% while resting on room air. Blood work was mostly unremarkable with a slightly increased white count and blood sugar likely related to recent steroid use. Chest x-ray showed no significant changes head CT was unremarkable. Patient ambulated off oxygen and desatted to 84% on room air. She immediately recovered and came back up into the 90s on 2 L nasal cannula. I advised the patient to use her oxygen over the next several days. She also needs to make sure she is staying hydrated. She was comfortable being discharged home with instructions to return to the ED if she has any worsening of her symptoms or O2 sats that do not improve on oxygen. She already has a pulse oximeter at home.        Amount and/or Complexity of Data Reviewed  Clinical lab tests: reviewed  Tests in the radiology section of CPT®: reviewed  Tests in the medicine section of CPT®: reviewed      ED Course as of Jan 20 1501 Wed Jan 20, 2021   1316 ED EKG interpretation:  Rhythm: normal sinus rhythm. Rate (approx.): 91. Axis: normal.  ST segment:  No concerning ST elevations or depressions. This EKG was interpreted by Emelina Vincent MD,ED Provider.        EKG, 12 LEAD, INITIAL [JM]      ED Course User Index  [JM] Priyanka Trejo MD       Procedures

## 2021-01-20 NOTE — PROGRESS NOTES
SSED/CM consult received and appreciated. Patient covid-19+ and will need [facility] visit. Attempted to reach by phone [phone] (home/mobile). Spoke w/ spouse Budd Koyanagi and verified home number and provided [phone] informed patient will need to decide regarding [facility] referral.     Attempted to place call and mailbox is full. Contact made w/ [facility] informed of current vitals. CM will re-attempt when medically appropriate. Normal is O2 Provider.

## 2021-01-20 NOTE — ED TRIAGE NOTES
Pt stated she was just released from here with Covid , here x 4 days, pt stated her oxygen sats are low, c/o cough, low grade fever, denies n/v, denies sob, denies diarrhea

## 2021-01-20 NOTE — PROGRESS NOTES
Date of previous inpatient admission/ ED visit? 1/9/21-1/12/21 Inpatient Admission (covid-19)    What brought the patient back to ED? Patient presents w/ weakness and syncope  Did patient decline recommended services during last admission/ ED visit (if yes, what)? No    Has patient seen a provider since their last inpatient admission/ED visit (if yes, when)? Virtual Visit 1/18/21    PCP: Graham Menezes and Last name: Jerry Parikh   Name of Practice:    Are you a current patient: Yes/No: Yes   Approximate date of last visit:  1/18/21  CM Interventions:  From previous inpatient admission/ED visit: Assessment/ O2 Referral  From current inpatient admission/ED visit: Assessment    EMR reviewed. Call placed to patient and [facility] requested patient in agreement. FOC offered. No preferences of providers. [facility] and [facility] are [employer]. Transportation home provided by spouse. No transitional care needs at this time. Care Management Interventions  PCP Verified by CM: Yes  Last Visit to PCP: 01/18/21  Palliative Care Criteria Met (RRAT>21 & CHF Dx)?: No  Transition of Care Consult (CM Consult): [address]: Yes  MyChart Signup: Yes  Discharge Durable Medical Equipment: No  Health Maintenance Reviewed: Yes  Physical Therapy Consult: No  Occupational Therapy Consult: No  Speech Therapy Consult: No  Current Support Network: Lives with Spouse, Own Home  Confirm Follow Up Transport: Family  The Patient and/or Patient Representative was Provided with a Choice of Provider and Agrees with the Discharge Plan?: Yes  Name of the Patient Representative Who was Provided with a Choice of Provider and Agrees with the Discharge Plan: self  [employer] Information Provided?: No  Discharge Location  Discharge Placement: Home with home health    Referral sent to [address] [place] [address].

## 2021-01-20 NOTE — ED NOTES
Ambulated pt on short walk around nurses station, O2 sat 92. Took longer walk to bathroom- O2 85 . MD okay because pt has sup O2 at home.

## 2021-01-21 ENCOUNTER — DOCUMENTATION ONLY (OUTPATIENT)
Dept: CASE MANAGEMENT | Age: 71
End: 2021-01-21

## 2021-01-21 ENCOUNTER — PATIENT OUTREACH (OUTPATIENT)
Dept: CASE MANAGEMENT | Age: 71
End: 2021-01-21

## 2021-01-21 NOTE — ACP (ADVANCE CARE PLANNING)
Pt has no ACP on file in medical record. Urged family to discuss with pt and physician to set up Health Care Proxy and possible wishes of the patient in regard to life support measures she may or not desire to attempted.

## 2021-01-21 NOTE — PROGRESS NOTES
F/U call placed to patient spoke w/ spouse Melanie Jean - introduced to role of CM and HIPAA identifier verified. Informed [facility] on 1/22/21. Spouse states O2 concentrator is not working and patient is on 1/2 tank w/ 1 tank left. Provider informed will work patient into delivery schedule and troubleshoot. CM acknowledged would place a courtesy call to [address] [place] [address] spoke w/ [facility] and requested a call back regarding tentative window. Call received back ETA 1 hour ([phone]). CM made contact back w/  [phone] and provided updates. Informed CM available if additional interventions needed. Spouse expressed appreciation.

## 2021-01-21 NOTE — PROGRESS NOTES
Referral sent to [facility] unable to accept secondary to staff. Additional referral sent to [facility] agency [phone]  requesting specific discipline for service. Updated order received for HH/RN. Faxed to [phone].

## 2021-01-21 NOTE — PROGRESS NOTES
Patient contacted regarding SROYP-07 diagnosis\". Performed . Discussed COVID-19 related testing which was available at this time. Test results were positive. Patient informed of results, if available? yes     Care Transition Nurse/ Ambulatory Care Manager contacted the family by telephone to perform post discharge assessment. Call within 2 business days of discharge: Yes Verified name and  with family as identifiers. Provided introduction to self, and explanation of the CTN/ACM role, and reason for call due to risk factors for infection and/or exposure to COVID-19. Symptoms reviewed with family who verbalized the following symptoms: SPO2 in high 80% range on room air. Mid 90% range with supplemental oxygen. Due to no new or worsening symptoms encounter was not routed to provider for escalation. Discussed follow-up appointments. If no appointment was previously scheduled, appointment scheduling offered:  [facility] follow up appointment(s): No future appointments. Non-[facility] follow up appointment(s): urged to call for appointment     Advance Care Planning:   Does patient have an Advance Directive:  see ACP note. Patient has following risk factors of: pneumonia. CTN/ACM reviewed discharge instructions, medical action plan and red flags such as increased shortness of breath, increasing fever and signs of decompensation with family who verbalized understanding. Discussed exposure protocols and quarantine with CDC Guidelines What to do if you are sick with coronavirus disease .  Family was given an opportunity for questions and concerns. The family agrees to contact the [employer] exposure line [phone], [facility] department  [address]  [phone] and PCP office for questions related to their healthcare. CTN/ACM provided contact information for future needs. Reviewed and educated family on any new and changed medications related to discharge diagnosis.  Family state the oxygen concentrator malfunctioning. Lissette Chao notified. They are using portable tanks till [facility] comes. Also family given phone number for [facility] to see when they will be coming. Patient/family/caregiver given information for [employer] and agrees to enroll no  14 day call based on severity of symptoms and risk factors.

## 2021-02-03 ENCOUNTER — PATIENT OUTREACH (OUTPATIENT)
Dept: CASE MANAGEMENT | Age: 71
End: 2021-02-03

## 2022-03-20 PROBLEM — U07.1 COVID-19 VIRUS INFECTION: Status: ACTIVE | Noted: 2021-01-09

## 2023-05-10 RX ORDER — ZINC SULFATE 50(220)MG
220 CAPSULE ORAL DAILY
COMMUNITY

## 2023-05-10 RX ORDER — ASCORBIC ACID 500 MG
500 TABLET ORAL DAILY
COMMUNITY

## 2023-05-10 RX ORDER — DULAGLUTIDE 1.5 MG/.5ML
1.5 INJECTION, SOLUTION SUBCUTANEOUS
COMMUNITY

## 2023-05-10 RX ORDER — METFORMIN HYDROCHLORIDE 500 MG/1
1000 TABLET, EXTENDED RELEASE ORAL 2 TIMES DAILY WITH MEALS
COMMUNITY

## 2023-05-10 RX ORDER — OMEGA-3-ACID ETHYL ESTERS 1 G/1
1 CAPSULE, LIQUID FILLED ORAL 2 TIMES DAILY
COMMUNITY
Start: 2013-03-16